# Patient Record
Sex: MALE | Race: WHITE | NOT HISPANIC OR LATINO | Employment: FULL TIME | URBAN - METROPOLITAN AREA
[De-identification: names, ages, dates, MRNs, and addresses within clinical notes are randomized per-mention and may not be internally consistent; named-entity substitution may affect disease eponyms.]

---

## 2018-05-25 LAB — HBA1C MFR BLD HPLC: 7 %

## 2018-07-06 ENCOUNTER — OFFICE VISIT (OUTPATIENT)
Dept: PULMONOLOGY | Facility: CLINIC | Age: 58
End: 2018-07-06
Payer: COMMERCIAL

## 2018-07-06 VITALS
OXYGEN SATURATION: 96 % | SYSTOLIC BLOOD PRESSURE: 124 MMHG | HEIGHT: 69 IN | HEART RATE: 77 BPM | DIASTOLIC BLOOD PRESSURE: 86 MMHG | BODY MASS INDEX: 33.18 KG/M2 | WEIGHT: 224 LBS

## 2018-07-06 DIAGNOSIS — E66.9 OBESITY (BMI 30-39.9): ICD-10-CM

## 2018-07-06 DIAGNOSIS — G47.33 OSA (OBSTRUCTIVE SLEEP APNEA): Primary | ICD-10-CM

## 2018-07-06 PROCEDURE — 99204 OFFICE O/P NEW MOD 45 MIN: CPT | Performed by: INTERNAL MEDICINE

## 2018-07-06 RX ORDER — VALSARTAN AND HYDROCHLOROTHIAZIDE 320; 12.5 MG/1; MG/1
320 TABLET, FILM COATED ORAL
COMMUNITY
Start: 2018-04-18

## 2018-07-06 RX ORDER — AMLODIPINE BESYLATE 10 MG/1
10 TABLET ORAL
COMMUNITY
Start: 2018-04-17

## 2018-07-06 RX ORDER — METFORMIN HYDROCHLORIDE 500 MG/1
500 TABLET, EXTENDED RELEASE ORAL
COMMUNITY
Start: 2018-05-26

## 2018-07-06 NOTE — PROGRESS NOTES
Assessment/Plan:     Diagnoses and all orders for this visit:    FESTUS (obstructive sleep apnea)  -     Diagnostic Sleep Study; Future    Obesity (BMI 30-39  9)    Other orders  -     amLODIPine (NORVASC) 10 mg tablet; 10 mg  -     metFORMIN (GLUCOPHAGE-XR) 500 mg 24 hr tablet; 500 mg  -     valsartan-hydrochlorothiazide (DIOVAN-HCT) 320-12 5 MG per tablet; 320 tablets      obstructive sleep apnea Etiology pathogenesis discussed with the patient in detail  Patient has signs and symptoms of obstructive sleep apnea  He will undergo an all night polysomnogram and if there is evidence of abnormal nocturnal breathing he will undergo a CPAP titration study for maximal benefit  Consequences of untreated sleep apnea including excessive daytime sleepiness and increased risk for myocardial infarction stroke atrial fibrillation discussed with the patient  Testing procedure was discussed in detail  Cautioned against driving when sleepy  Recommend weight loss  Follow-up after the above testing  Return in about 3 months (around 10/6/2018)  All questions are answered to the patient's satisfaction and understanding  He verbalizes understanding  He is encouraged to call with any further questions or concerns  Portions of the record may have been created with voice recognition software  Occasional wrong word or "sound a like" substitutions may have occurred due to the inherent limitations of voice recognition software  Read the chart carefully and recognize, using context, where substitutions have occurred  Electronically Signed by Kylee Crowell MD    ______________________________________________________________________    Chief Complaint:   Chief Complaint   Patient presents with    Sleep Apnea        Patient ID: Rene Mcgowan is a 62 y o  y o  male has a past medical history of Snores      7/6/2018  Patient is a very pleasant 51-year-old gentleman, who has never smoked, who works as a computer day, states in [place] has a 1-1/2 hour commute to work each way every day, works 5 days a week  He states he goes to bed at around 11:30, falls asleep right away and he is out of bed at 6:30 AM  He does not have any nocturnal awakenings  There is history of snoring and witnessed apneic spells as described by his wife  There is no history of any early morning headaches, no symptoms related to restless legs, no symptoms of lack of concentration or short-term memory loss or nocturnal heartburn  No symptoms related to anxiety or depression  He states that he was diagnosed with sleep apnea a few years ago, and was also placed on CPAP but he did not use it and just returned the CPAP right away  Given his snoring has worsened recently, and he states his wife has been complaining, for which his PCP had put in a referral for reevaluation for sleep apnea  Occupational/Exposure history:  Travel history:  Review of Systems   Constitutional: Positive for fatigue  Negative for appetite change, chills, diaphoresis, fever and unexpected weight change  HENT: Negative for congestion, ear discharge, ear pain, nosebleeds, postnasal drip, rhinorrhea, sinus pain, sore throat and voice change  Eyes: Negative for pain, discharge and visual disturbance  Respiratory: Negative for apnea, cough, choking, chest tightness, shortness of breath, wheezing and stridor  Cardiovascular: Negative for chest pain, palpitations and leg swelling  Gastrointestinal: Negative for abdominal pain, blood in stool, constipation, diarrhea and vomiting  Endocrine: Negative for cold intolerance, heat intolerance, polydipsia, polyphagia and polyuria  Genitourinary: Negative for difficulty urinating and dysuria  Musculoskeletal: Negative for arthralgias and neck pain  Skin: Negative for pallor and rash  Allergic/Immunologic: Negative for environmental allergies and food allergies     Neurological: Negative for dizziness, speech difficulty, weakness and light-headedness  Hematological: Negative for adenopathy  Does not bruise/bleed easily  Psychiatric/Behavioral: Negative for agitation, confusion and sleep disturbance  The patient is not nervous/anxious  Social history: He reports that he has never smoked  He has never used smokeless tobacco  He reports that he drinks about 1 2 oz of alcohol per week   Past surgical history: No past surgical history on file  Family history: No family history on file  There is no immunization history on file for this patient  Current Outpatient Prescriptions   Medication Sig Dispense Refill    amLODIPine (NORVASC) 10 mg tablet 10 mg      metFORMIN (GLUCOPHAGE-XR) 500 mg 24 hr tablet 500 mg      valsartan-hydrochlorothiazide (DIOVAN-HCT) 320-12 5 MG per tablet 320 tablets       No current facility-administered medications for this visit  Allergies: Patient has no known allergies  Objective:  Vitals:    07/06/18 1133   BP: 124/86   Pulse: 77   SpO2: 96%   Weight: 102 kg (224 lb)   Height: 5' 8 75" (1 746 m)   Oxygen Therapy  SpO2: 96 %    Wt Readings from Last 3 Encounters:   07/06/18 102 kg (224 lb)     Body mass index is 33 32 kg/m²  Physical Exam   Constitutional: He is oriented to person, place, and time  He appears well-developed and well-nourished  HENT:   Head: Normocephalic and atraumatic  Crowded oropharyngeal airways,bilateral tonsillar tissue enlargement, presence of redundant mucus   Mallampati score 4   Eyes: EOM are normal  Pupils are equal, round, and reactive to light  Neck: Normal range of motion  Neck supple  Short and wide neck   Cardiovascular: Normal rate, regular rhythm and normal heart sounds  Pulmonary/Chest: Effort normal and breath sounds normal    Abdominal: Soft  Bowel sounds are normal    Musculoskeletal: Normal range of motion  Neurological: He is alert and oriented to person, place, and time  Skin: Skin is warm and dry     Psychiatric: He has a normal mood and affect   His behavior is normal             ESS: Total score: 1

## 2018-07-10 ENCOUNTER — TELEPHONE (OUTPATIENT)
Dept: PULMONOLOGY | Facility: CLINIC | Age: 58
End: 2018-07-10

## 2018-07-13 ENCOUNTER — TELEPHONE (OUTPATIENT)
Dept: PULMONOLOGY | Facility: CLINIC | Age: 58
End: 2018-07-13

## 2018-07-19 ENCOUNTER — TELEPHONE (OUTPATIENT)
Dept: PULMONOLOGY | Facility: CLINIC | Age: 58
End: 2018-07-19

## 2018-07-19 DIAGNOSIS — G47.33 OSA (OBSTRUCTIVE SLEEP APNEA): Primary | ICD-10-CM

## 2018-08-09 LAB — MICROALBUMIN UR-MCNC: 91.3 MG/L (ref 0–20)

## 2018-08-30 ENCOUNTER — TELEPHONE (OUTPATIENT)
Dept: UROLOGY | Facility: CLINIC | Age: 58
End: 2018-08-30

## 2018-08-30 NOTE — TELEPHONE ENCOUNTER
Patient called and left message stating that he had received letter in the mail stating that he needs to call and reschedule appointment  Patient was scheduled for an appointment for elevated PSA on 8/31  Patient was canceled due to provider being out of the office  Patient should be reschedule within the next one month  I do not see any available appointments within the one month   Can you help me find a spot please

## 2018-08-30 NOTE — TELEPHONE ENCOUNTER
1st attempt, left message with appointment details asking for a call back to confirm and also mailed appointment card

## 2018-09-05 NOTE — PATIENT INSTRUCTIONS
PROSTATE CANCER SCREENING OVERVIEW    Prostate cancer screening involves testing for prostate cancer in men who have no symptoms of the disease  This testing can find cancer at an early stage  However, medical experts agree that prostate cancer screening should not be routinely ordered for all men and that screening can lead to both benefits and harms  This article is designed to review the advantages and disadvantages of prostate cancer screening  You should talk with your health care provider to decide what is best in your individual situation  WHAT IS PROSTATE CANCER? Prostate cancer is a cancer of the prostate, a small gland in men that is located below the bladder and in front of the rectum (figure 1)  The prostate produces fluid that helps carry sperm during ejaculation  Although many men are diagnosed with prostate cancer, most of them do not die from their cancer  Prostate cancer often grows so slowly that many men die of other causes before they even develop symptoms of prostate cancer  PROSTATE CANCER RISK FACTORS  Age -- All men are at risk for prostate cancer, but the risk greatly increases with older age  Prostate cancer is rarely found in men younger than 48years old  Ethnic background --  men develop prostate cancer more often than white and  men   men also are more likely to die of prostate cancer than white or  men  Family medical history -- Men who have a first-degree relative (a father or brother) with prostate cancer are more likely to develop the disease  Men with female relatives with breast cancer related to the breast cancer gene (BRCA) may also be more likely to develop prostate cancer  Diet -- A diet high in animal fat or low in vegetables may increase a man's risk of prostate cancer      PROSTATE CANCER SCREENING TESTS  Prostate cancer screening involves blood test that measures prostate-specific antigen (PSA)  Prostate-specific antigen (PSA) -- PSA is a protein produced by the prostate  The PSA test measures the amount of PSA in a sample of blood  Although many men with prostate cancer have an elevated PSA concentration, a high level does not necessarily mean there is a cancer  The most common cause for an elevated PSA is benign prostatic hyperplasia (BPH), a noncancerous enlargement of the prostate  Other causes include prostate infection (prostatitis), trauma (bicycle riding), and sexual activity  You should avoid ejaculating or riding a bike for at least 48 hours before having a PSA test  (See "Patient education: Benign prostatic hyperplasia (BPH) (Beyond the Basics)"  )    Rectal examination -- A rectal examination is sometimes recommended, along with measurement of the PSA, to screen for prostate cancer  However, studies have not shown that rectal examination is an effective screening test for prostate cancer when used alone  If the PSA test is positive -- A positive PSA test is not a reason to panic; noncancerous conditions are the most common causes for an abnormal test, particularly for PSA tests  On the other hand, a positive test should not be ignored  The first step in evaluating an elevated PSA is usually to repeat the test   You should avoid ejaculating and riding a bike for at least 48 hours before repeating the test  If the PSA remains elevated, a prostate biopsy or other testing is usually recommended  Prostate biopsy -- A prostate biopsy involves having a rectal ultrasound and use of a needle to obtain tissue samples from the prostate gland  The biopsy is usually performed in the office by a urologist (a doctor who specializes in treatment of urinary, bladder, and prostate issues)  After the procedure, most men feel sore and you may see some blood in the urine or semen, this can last for up to 4-6 weeks  Biopsies can rarely cause serious infections   Sometimes biopsies are guided by magnetic resonance imaging (MRI)  PROS AND CONS OF PROSTATE CANCER SCREENING    There are a number of arguments for and against prostate cancer screening  Arguments for screening -- Experts in favor of prostate cancer screening cite the following arguments:    ?Results from a large  study of prostate cancer screening found that men who had prostate-specific antigen (PSA) testing had a 20 percent lower chance of dying from prostate cancer after 13 years compared with men who did not have prostate cancer screening [1]  Men who had PSA testing also had a 30 percent lower chance of developing metastatic disease (cancer that has spread to other parts of the body) [2]  In another  study of prostate cancer screening, the mortality benefit was even larger to prostate cancer screening  (the [place] trial)    ? A substantial number of men die from prostate cancer every year and many more suffer from the complications of advanced disease  ? For men with an aggressive prostate cancer, the best chance for curing it is by finding it at an early stage and then treating it with surgery or radiation  Studies have shown that men who have prostate cancer detected by PSA screening tend to have earlier-stage cancer than men who have a cancer detected by other means  (See "Patient education: Treatment for advanced prostate cancer (Beyond the Basics)" and "Patient education: Prostate cancer treatment; stage I to III cancer (Beyond the Basics)" )    ? The five-year survival for men who have prostate cancer confined to the prostate gland (early stage) is nearly 100 percent; this drops to 27 percent for men whose cancer has spread to other areas of the body  However, many early-stage cancers are not aggressive, and the five-year survival for those will be nearly 100 percent even without any treatment [3]  ?The available screening tests are not perfect, but they are easy to perform and have fair accuracy    Arguments against screening -- Other arguments have also been made against screening:    ?Even though the  study found a benefit of prostate cancer screening, PSA testing prevented only about one prostate cancer death for every 1000 screened men after 13 years [1]  Furthermore, 76 percent of men with an abnormal PSA who had a prostate biopsy did not have prostate cancer  However, in the [place] study, the number needed to screen and treat was much lower indicating that prostate cancer screening is ineffective screening measure which decreases the morbidity and mortality of prostate cancer  ?Many prostate cancers detected with screening are unlikely to cause death or disability  Thus, a number of men will be diagnosed with cancer and potentially suffer the side effects of cancer treatment for cancers that never would have been found without prostate cancer screening  In other words, even if screening finds a cancer early, it is not clear in all cases that the cancer must be treated  IS PROSTATE CANCER SCREENING RIGHT FOR ME? The answer to this question is not the same for everyone  The table includes some questions you can ask yourself when weighing the potential risk and benefits of screening (table 1)  Professional organizations -- Major medical associations and societies, including the [employer] Task Force [4], American Cancer Society [5], American Urological Association [6], and many  cancer societies, agree that men should discuss screening with their health care providers  Men should be informed about the benefits and risks of prostate cancer screening and treatment and make decisions that best reflect their personal values and preferences  Age to first consider screening -- Screening discussions should begin at age 48 years for men at average risk for developing prostate cancer   Men with risk factors for prostate cancer (such as black men or men with a father or brother who had prostate cancer) may want to begin screening discussions at age 36 to 39 years  How often to perform screening -- Once screening begins, it should occur every two to four years (if continued testing is desired) and should include a PSA blood test   Age to stop screening -- Guidelines suggest stopping screening after age 71, though some experts would continue offering screening to very healthy men beyond that age  Screening not recommended -- Screening is generally not recommended for men whose life expectancy, or ability to undergo curative treatment, is limited by serious health problems  In these situations, the potential benefits of screening are outweighed by the likely harms  WHERE TO GET MORE INFORMATION  Your healthcare provider is the best source of information for questions and concerns related to your medical problem  This article will be updated as needed on our web site (www [employer]/patients)  Related topics for patients, as well as selected articles written for healthcare professionals, are also available  Some of the most relevant are listed below  Patient level information -- [employer] offers two types of patient education materials  The Basics -- The Basics patient education pieces answer the four or five key questions a patient might have about a given condition  These articles are best for patients who want a general overview and who prefer short, easy-to-read materials  Patient education: Prostate cancer screening (PSA tests) (The Basics)  Patient education: Prostate cancer (The Basics)  Patient education: Cancer screening (The Basics)  Patient education: Choosing treatment for low-risk localized prostate cancer (The Basics)  Beyond the Basics -- Beyond the Basics patient education pieces are longer, more sophisticated, and more detailed  These articles are best for patients who want in-depth information and are comfortable with some medical jargon    Patient education: Treatment for advanced prostate cancer (Beyond the Basics)  Patient education: Prostate cancer treatment; stage I to III cancer (Beyond the Basics)  Patient education: Benign prostatic hyperplasia (BPH) (Beyond the Basics)  Professional level information -- Professional level articles are designed to keep doctors and other health professionals up-to-date on the latest medical findings  These articles are thorough, long, and complex, and they contain multiple references to the research on which they are based  Professional level articles are best for people who are comfortable with a lot of medical terminology and who want to read the same materials their doctors are reading  Measurement of prostate-specific antigen  Screening for prostate cancer  The following organizations also provide reliable health information  ? [employer]  1-726-0-CANCER  (www cancer gov/types/prostate)  ? American Society for Clinical Oncology (patient information website)  (www cancer  net)  ? [address] [place] [address] (patient and caregiver information website)  (www nccn org/patients)  ? [address]  0-191-SZS-2345  ([employer])  ? [employer]  (www [employer] gov/healthtopics  html)  ? [employer] Prostate Cancer Education and Support  (www ustoo  org/Detection-PSA-And-TITO)        Prostate Specific Antigen   WHAT YOU NEED TO KNOW:   What is a prostate specific antigen (PSA) test?  A PSA test is a blood test used to screen men for prostate cancer  A PSA test is also used to monitor how well prostate cancer treatment is working  What other test may be done with a PSA test?  A digital rectal exam is usually performed with a PSA test  Your healthcare provider will insert a gloved finger into your rectum to feel if your prostate is large, firm, or has lumps  Who needs a PSA test?  Some experts recommend a PSA test for men ages 48 to 79   They also recommend testing men with a high risk for prostate cancer at age 36 or 39  Risk factors may include being  or having a brother or father with prostate cancer  Other experts may not recommend PSA testing  Your healthcare provider can help you decide if you need a PSA test    What do the results of a PSA test mean? Most healthy men have a PSA level less than 4 ng/mL  If your PSA level is higher than 4 ng/mL you may need more tests  Examples include blood or urine tests, an ultrasound, MRI, CT scan, or a prostate biopsy  Ask your healthcare provider for more information on these tests  What else can cause a high PSA level? A high PSA level does not always mean you have prostate cancer  Certain conditions or procedures can increase PSA levels  Examples include:  · Older age    · Procedures such as a prostate biopsy or a cystoscopy    · An enlarged prostate    · Recent sexual activity    · A prostate infection    · Certain exercises that put pressure on the prostate such as bicycling    · Medicine such as testosterone  CARE AGREEMENT:   You have the right to help plan your care  Learn about your health condition and how it may be treated  Discuss treatment options with your caregivers to decide what care you want to receive  You always have the right to refuse treatment  The above information is an  only  It is not intended as medical advice for individual conditions or treatments  Talk to your doctor, nurse or pharmacist before following any medical regimen to see if it is safe and effective for you  © 2017 [address] Information is for End User's use only and may not be sold, redistributed or otherwise used for commercial purposes  All illustrations and images included in CareNotes® are the copyrighted property of [employer]  or [employer] Prado

## 2018-09-05 NOTE — PROGRESS NOTES
Problem List Items Addressed This Visit     Elevated PSA, less than 10 ng/ml - Primary     I discussed with the patient the discovery of the PSA molecule and its original use in determining the return of prostate cancer after definitive therapy  I described the normal function of the PSA molecule in the reproductive process and also discussed the detection of PSA in the blood  We discussed the controversial history of PSA screening for prostate cancer in the [facility] as well as the risk of over detection and over treatment of prostate cancer by way of PSA screening  The patient understands that PSA blood testing is an imperfect way to screen for prostate cancer and that elevated PSA levels in the blood may also be caused by infection, inflammation, prostatic trauma or manipulation, urological procedures, or by benign prostatic enlargement  The role of the digital rectal examination in prostate cancer screening was also discussed and I discussed with him that there is large interobserver variability in the findings of digital rectal examination  We discussed the continued workup of elevated PSA or abnormal digital rectal examination in the form of the performance of a prostate biopsy  The preparation for, and steps of, an office-based transrectal ultrasound of prostate biopsy were described to the patient  Benefits of obtaining tissue for pathologic analysis were discussed with him and the risks of prostate biopsy were also discussed at length  These risks include but are not limited to infection, bleeding, pain, sepsis with need for admission to hospital, risk of change in sexual function, and risk of diagnosis with prostate cancer  Alternatives to prostate biopsy in the form of continued PSA and TITO surveillance were also offered to him  All of his questions and concerns were answered and addressed with regard to that detailed above      Going forward we will plan to see him back in a number of weeks to review his PSA free and total, if this is elevated I am sent this electronically we will set him up for prostate biopsy at our next visit           Relevant Orders    Urinalysis with microscopic    PSA, total and free              Assessment and plan:       Please see problem oriented charting for the assessment plan of today's urological complaints    Lana Ibarra MD      Chief Complaint     Chief Complaint   Patient presents with    Elevated PSA         History of Present Illness     Randy Alamo is a 62 y o  gentleman referred in consultation by Dr Petros Mena for elevated PSA  A report of today's consultation report will be sent to the referring physician in the name of continuity of care  The patient is never had elevated PSA previously, he has no family history of prostate cancer or of lower urinary tract symptoms, and his PSA was seen to be 5 0 nanograms/milliliter as of May 2018  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  He does not have any dysuria, incontinence, hesitancy of urination, gross hematuria, urgency, he feels empty after voiding and stream quality is good and he awakens once at night to void  His AUA symptom score is 1 with a bother score of 0  He was unable to give us a urinalysis in the office today  His review of systems is otherwise negative today  The following portions of the patient's history were reviewed and updated as appropriate: allergies, current medications, past family history, past medical history, past social history, past surgical history and problem list     Detailed Urologic History     - please refer to HPI    Review of Systems     Review of Systems   Constitutional: Negative  HENT: Negative  Eyes: Negative  Respiratory: Negative  Cardiovascular: Negative  Gastrointestinal: Negative  Endocrine: Negative  Genitourinary:        As per HPI   Musculoskeletal: Negative  Skin: Negative  Allergic/Immunologic: Negative  Neurological: Negative  Hematological: Negative  Psychiatric/Behavioral: Negative  Allergies     No Known Allergies    Physical Exam     Physical Exam   Constitutional: He is oriented to person, place, and time  He appears well-developed and well-nourished  No distress  Appears nontoxic, BMI 33 9, dark skinned Hong Ebenezer man   HENT:   Head: Normocephalic and atraumatic  Right Ear: External ear normal    Left Ear: External ear normal    Mouth/Throat: No oropharyngeal exudate  Eyes: Conjunctivae and EOM are normal  Pupils are equal, round, and reactive to light  Right eye exhibits no discharge  Left eye exhibits no discharge  No scleral icterus  Neck: Normal range of motion  Neck supple  No tracheal deviation present  Cardiovascular: Regular rhythm and intact distal pulses  Pulmonary/Chest: Effort normal  No stridor  No respiratory distress  He has no wheezes  Abdominal: Soft  He exhibits no distension and no mass  There is no tenderness  There is no rebound and no guarding  No hernia  Hernia confirmed negative in the right inguinal area and confirmed negative in the left inguinal area  Genitourinary: Rectal exam shows no external hemorrhoid, no internal hemorrhoid, no fissure, no mass, no tenderness and anal tone normal  Prostate is enlarged (35 grams, smooth, no nodules)  Prostate is not tender  Right testis shows no mass, no swelling and no tenderness  Right testis is descended  Cremasteric reflex is not absent on the right side  Left testis shows no mass, no swelling and no tenderness  Left testis is descended  Cremasteric reflex is not absent on the left side  Uncircumcised  No phimosis, paraphimosis, hypospadias, penile erythema or penile tenderness  No discharge found  Musculoskeletal: Normal range of motion  He exhibits no edema, tenderness or deformity  Lymphadenopathy: No inguinal adenopathy noted on the right or left side  Neurological: He is alert and oriented to person, place, and time  No cranial nerve deficit or sensory deficit  He exhibits normal muscle tone  Coordination normal    Skin: Skin is warm and dry  No rash noted  He is not diaphoretic  No erythema  No pallor  Psychiatric: He has a normal mood and affect  His behavior is normal  Judgment and thought content normal    Nursing note and vitals reviewed  Vital Signs  Vitals:    09/06/18 0927   BP: 132/90   BP Location: Left arm   Patient Position: Sitting   Cuff Size: Adult   Pulse: 74   Weight: 103 kg (228 lb)   Height: 5' 8 75" (1 746 m)         Current Medications       Current Outpatient Prescriptions:     amLODIPine (NORVASC) 10 mg tablet, 10 mg, Disp: , Rfl:     metFORMIN (GLUCOPHAGE-XR) 500 mg 24 hr tablet, 500 mg, Disp: , Rfl:     valsartan-hydrochlorothiazide (DIOVAN-HCT) 320-12 5 MG per tablet, 320 tablets, Disp: , Rfl:     losartan-hydrochlorothiazide (HYZAAR) 100-25 MG per tablet, , Disp: , Rfl:       Active Problems     Patient Active Problem List   Diagnosis    Elevated PSA, less than 10 ng/ml         Past Medical History     Past Medical History:   Diagnosis Date    Snores          Surgical History     History reviewed  No pertinent surgical history  Family History     History reviewed  No pertinent family history        Social History     Social History     History   Smoking Status    Never Smoker   Smokeless Tobacco    Never Used         Pertinent Lab Values     No results found for: CREATININE     Testosterone seen to be 411, normal  PSA 5 0 as of May 2018        Pertinent Imaging      There is no pertinent urological imaging for my review

## 2018-09-05 NOTE — TELEPHONE ENCOUNTER
PT confirmed  Notes: elevated psa   Made On:  Changed:  Confirmed: 8/30/2018 1:53 PM  8/31/2018 3:03 PM  9/5/2018 9:40 AM By:  By:  By: Silverio Dalton (DENICE)  Silverio Dalton (DENICE)  Jerica Krishnan (DENICE)

## 2018-09-06 ENCOUNTER — OFFICE VISIT (OUTPATIENT)
Dept: UROLOGY | Facility: CLINIC | Age: 58
End: 2018-09-06
Payer: COMMERCIAL

## 2018-09-06 ENCOUNTER — TELEPHONE (OUTPATIENT)
Dept: UROLOGY | Facility: CLINIC | Age: 58
End: 2018-09-06

## 2018-09-06 VITALS
WEIGHT: 228 LBS | BODY MASS INDEX: 33.77 KG/M2 | SYSTOLIC BLOOD PRESSURE: 132 MMHG | HEART RATE: 74 BPM | DIASTOLIC BLOOD PRESSURE: 90 MMHG | HEIGHT: 69 IN

## 2018-09-06 DIAGNOSIS — R97.20 ELEVATED PSA, LESS THAN 10 NG/ML: Primary | ICD-10-CM

## 2018-09-06 PROCEDURE — 99244 OFF/OP CNSLTJ NEW/EST MOD 40: CPT | Performed by: UROLOGY

## 2018-09-06 RX ORDER — LOSARTAN POTASSIUM AND HYDROCHLOROTHIAZIDE 25; 100 MG/1; MG/1
TABLET ORAL
COMMUNITY
Start: 2018-07-20

## 2018-09-06 NOTE — TELEPHONE ENCOUNTER
Pt needs a two week followup in [place] with Dr Cathie Fonseca to go over the labs he will be having done

## 2018-09-06 NOTE — ASSESSMENT & PLAN NOTE
I discussed with the patient the discovery of the PSA molecule and its original use in determining the return of prostate cancer after definitive therapy  I described the normal function of the PSA molecule in the reproductive process and also discussed the detection of PSA in the blood  We discussed the controversial history of PSA screening for prostate cancer in the [place] as well as the risk of over detection and over treatment of prostate cancer by way of PSA screening  The patient understands that PSA blood testing is an imperfect way to screen for prostate cancer and that elevated PSA levels in the blood may also be caused by infection, inflammation, prostatic trauma or manipulation, urological procedures, or by benign prostatic enlargement  The role of the digital rectal examination in prostate cancer screening was also discussed and I discussed with him that there is large interobserver variability in the findings of digital rectal examination  We discussed the continued workup of elevated PSA or abnormal digital rectal examination in the form of the performance of a prostate biopsy  The preparation for, and steps of, an office-based transrectal ultrasound of prostate biopsy were described to the patient  Benefits of obtaining tissue for pathologic analysis were discussed with him and the risks of prostate biopsy were also discussed at length  These risks include but are not limited to infection, bleeding, pain, sepsis with need for admission to hospital, risk of change in sexual function, and risk of diagnosis with prostate cancer  Alternatives to prostate biopsy in the form of continued PSA and TITO surveillance were also offered to him  All of his questions and concerns were answered and addressed with regard to that detailed above      Going forward we will plan to see him back in a number of weeks to review his PSA free and total, if this is elevated I am sent this electronically we will set him up for prostate biopsy at our next visit

## 2018-09-06 NOTE — LETTER
September 6, 2018     [address] [facility] [address]    Patient: Avinash Steele   YOB: 1960   Date of Visit: 9/6/2018       Dear Dr Shanda Malone: Thank you for referring Avinash Steele to me for evaluation  Below are my notes for this consultation  If you have questions, please do not hesitate to call me  I look forward to following your patient along with you  Sincerely,        Turner Vergara MD        CC: No Recipients  Turner Vergara MD  9/6/2018  9:56 AM  Sign at close encounter       Problem List Items Addressed This Visit     Elevated PSA, less than 10 ng/ml - Primary     I discussed with the patient the discovery of the PSA molecule and its original use in determining the return of prostate cancer after definitive therapy  I described the normal function of the PSA molecule in the reproductive process and also discussed the detection of PSA in the blood  We discussed the controversial history of PSA screening for prostate cancer in the [place] as well as the risk of over detection and over treatment of prostate cancer by way of PSA screening  The patient understands that PSA blood testing is an imperfect way to screen for prostate cancer and that elevated PSA levels in the blood may also be caused by infection, inflammation, prostatic trauma or manipulation, urological procedures, or by benign prostatic enlargement  The role of the digital rectal examination in prostate cancer screening was also discussed and I discussed with him that there is large interobserver variability in the findings of digital rectal examination  We discussed the continued workup of elevated PSA or abnormal digital rectal examination in the form of the performance of a prostate biopsy  The preparation for, and steps of, an office-based transrectal ultrasound of prostate biopsy were described to the patient   Benefits of obtaining tissue for pathologic analysis were discussed with him and the risks of prostate biopsy were also discussed at length  These risks include but are not limited to infection, bleeding, pain, sepsis with need for admission to hospital, risk of change in sexual function, and risk of diagnosis with prostate cancer  Alternatives to prostate biopsy in the form of continued PSA and TITO surveillance were also offered to him  All of his questions and concerns were answered and addressed with regard to that detailed above  Going forward we will plan to see him back in a number of weeks to review his PSA free and total, if this is elevated I am sent this electronically we will set him up for prostate biopsy at our next visit           Relevant Orders    Urinalysis with microscopic    PSA, total and free              Assessment and plan:       Please see problem oriented charting for the assessment plan of today's urological complaints    Tushar Mcgee MD      Chief Complaint     Chief Complaint   Patient presents with    Elevated PSA         History of Present Illness     Brennan Azevedo is a 62 y o  gentleman referred in consultation by Dr Az peter for elevated PSA  A report of today's consultation report will be sent to the referring physician in the name of continuity of care  The patient is never had elevated PSA previously, he has no family history of prostate cancer or of lower urinary tract symptoms, and his PSA was seen to be 5 0 nanograms/milliliter as of May 2018  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  He does not have any dysuria, incontinence, hesitancy of urination, gross hematuria, urgency, he feels empty after voiding and stream quality is good and he awakens once at night to void  His AUA symptom score is 1 with a bother score of 0  He was unable to give us a urinalysis in the office today  His review of systems is otherwise negative today      The following portions of the patient's history were reviewed and updated as appropriate: allergies, current medications, past family history, past medical history, past social history, past surgical history and problem list     Detailed Urologic History     - please refer to HPI    Review of Systems     Review of Systems   Constitutional: Negative  HENT: Negative  Eyes: Negative  Respiratory: Negative  Cardiovascular: Negative  Gastrointestinal: Negative  Endocrine: Negative  Genitourinary:        As per HPI   Musculoskeletal: Negative  Skin: Negative  Allergic/Immunologic: Negative  Neurological: Negative  Hematological: Negative  Psychiatric/Behavioral: Negative  Allergies     No Known Allergies    Physical Exam     Physical Exam   Constitutional: He is oriented to person, place, and time  He appears well-developed and well-nourished  No distress  Appears nontoxic, BMI 33 9, dark skinned [place] Eb[place]zer man   HENT:   Head: Normocephalic and atraumatic  Right Ear: External ear normal    Left Ear: External ear normal    Mouth/Throat: No oropharyngeal exudate  Eyes: Conjunctivae and EOM are normal  Pupils are equal, round, and reactive to light  Right eye exhibits no discharge  Left eye exhibits no discharge  No scleral icterus  Neck: Normal range of motion  Neck supple  No tracheal deviation present  Cardiovascular: Regular rhythm and intact distal pulses  Pulmonary/Chest: Effort normal  No stridor  No respiratory distress  He has no wheezes  Abdominal: Soft  He exhibits no distension and no mass  There is no tenderness  There is no rebound and no guarding  No hernia  Hernia confirmed negative in the right inguinal area and confirmed negative in the left inguinal area  Genitourinary: Rectal exam shows no external hemorrhoid, no internal hemorrhoid, no fissure, no mass, no tenderness and anal tone normal  Prostate is enlarged (35 grams, smooth, no nodules)  Prostate is not tender   Right testis shows no mass, no swelling and no tenderness  Right testis is descended  Cremasteric reflex is not absent on the right side  Left testis shows no mass, no swelling and no tenderness  Left testis is descended  Cremasteric reflex is not absent on the left side  Uncircumcised  No phimosis, paraphimosis, hypospadias, penile erythema or penile tenderness  No discharge found  Musculoskeletal: Normal range of motion  He exhibits no edema, tenderness or deformity  Lymphadenopathy: No inguinal adenopathy noted on the right or left side  Neurological: He is alert and oriented to person, place, and time  No cranial nerve deficit or sensory deficit  He exhibits normal muscle tone  Coordination normal    Skin: Skin is warm and dry  No rash noted  He is not diaphoretic  No erythema  No pallor  Psychiatric: He has a normal mood and affect  His behavior is normal  Judgment and thought content normal    Nursing note and vitals reviewed  Vital Signs  Vitals:    09/06/18 0927   BP: 132/90   BP Location: Left arm   Patient Position: Sitting   Cuff Size: Adult   Pulse: 74   Weight: 103 kg (228 lb)   Height: 5' 8 75" (1 746 m)         Current Medications       Current Outpatient Prescriptions:     amLODIPine (NORVASC) 10 mg tablet, 10 mg, Disp: , Rfl:     metFORMIN (GLUCOPHAGE-XR) 500 mg 24 hr tablet, 500 mg, Disp: , Rfl:     valsartan-hydrochlorothiazide (DIOVAN-HCT) 320-12 5 MG per tablet, 320 tablets, Disp: , Rfl:     losartan-hydrochlorothiazide (HYZAAR) 100-25 MG per tablet, , Disp: , Rfl:       Active Problems     Patient Active Problem List   Diagnosis    Elevated PSA, less than 10 ng/ml         Past Medical History     Past Medical History:   Diagnosis Date    Snores          Surgical History     History reviewed  No pertinent surgical history  Family History     History reviewed  No pertinent family history        Social History     Social History     History   Smoking Status    Never Smoker   Smokeless Tobacco    Never Used Pertinent Lab Values     No results found for: CREATININE     Testosterone seen to be 411, normal  PSA 5 0 as of May 2018        Pertinent Imaging      There is no pertinent urological imaging for my review

## 2018-09-10 ENCOUNTER — APPOINTMENT (OUTPATIENT)
Dept: LAB | Facility: CLINIC | Age: 58
End: 2018-09-10
Payer: COMMERCIAL

## 2018-09-10 DIAGNOSIS — R97.20 ELEVATED PSA, LESS THAN 10 NG/ML: ICD-10-CM

## 2018-09-10 LAB
BACTERIA UR QL AUTO: ABNORMAL /HPF
BILIRUB UR QL STRIP: NEGATIVE
CLARITY UR: CLEAR
COLOR UR: YELLOW
GLUCOSE UR STRIP-MCNC: ABNORMAL MG/DL
HGB UR QL STRIP.AUTO: NEGATIVE
HYALINE CASTS #/AREA URNS LPF: ABNORMAL /LPF
KETONES UR STRIP-MCNC: NEGATIVE MG/DL
LEUKOCYTE ESTERASE UR QL STRIP: ABNORMAL
NITRITE UR QL STRIP: NEGATIVE
NON-SQ EPI CELLS URNS QL MICRO: ABNORMAL /HPF
PH UR STRIP.AUTO: 5.5 [PH] (ref 4.5–8)
PROT UR STRIP-MCNC: NEGATIVE MG/DL
RBC #/AREA URNS AUTO: ABNORMAL /HPF
SP GR UR STRIP.AUTO: 1.02 (ref 1–1.03)
UROBILINOGEN UR QL STRIP.AUTO: 0.2 E.U./DL
WBC #/AREA URNS AUTO: ABNORMAL /HPF

## 2018-09-10 PROCEDURE — 36415 COLL VENOUS BLD VENIPUNCTURE: CPT

## 2018-09-10 PROCEDURE — 84153 ASSAY OF PSA TOTAL: CPT

## 2018-09-10 PROCEDURE — 84154 ASSAY OF PSA FREE: CPT

## 2018-09-10 PROCEDURE — 81001 URINALYSIS AUTO W/SCOPE: CPT | Performed by: UROLOGY

## 2018-09-11 LAB
PSA FREE MFR SERPL: 6.1 %
PSA FREE SERPL-MCNC: 0.36 NG/ML
PSA SERPL-MCNC: 5.9 NG/ML (ref 0–4)

## 2018-09-27 NOTE — PATIENT INSTRUCTIONS
Prostate Gland Needle Biopsy   WHAT YOU NEED TO KNOW:   A prostate gland needle biopsy is a procedure to remove samples of tissue from your prostate gland  The prostate is a gland in men located just below the bladder and surrounds the urethra (tube that carries urine out of the body)  After the samples are removed, they are sent to a lab and tested for cancer  HOW TO PREPARE:   Before your procedure:   · Ask your caregiver if you need to stop using aspirin or any other prescribed or over-the-counter medicine before your procedure or surgery  · Bring your medicine bottles or a list of your medicines when you see your caregiver  Tell your caregiver if you are allergic to any medicine  Tell your caregiver if you use any herbs, food supplements, or over-the-counter medicine  · You may need to start taking antibiotic medicine 1 day before your procedure  This medicine can help prevent an infection caused by bacteria  You may also need to take antibiotic medicine after your procedure  · Write down the correct date, time, and location of your procedure  The night before your procedure:  Ask caregivers about directions for eating and drinking  The day of your procedure:   · You or a close family member will be asked to sign a legal document called a consent form  It gives caregivers permission to do the procedure or surgery  It also explains the problems that may happen, and your choices  Make sure all your questions are answered before you sign this form  · Caregivers may insert an intravenous tube (IV) into your vein  A vein in the arm is usually chosen  Through the IV tube, you may be given liquids and medicine  · You may be given an enema (liquid medicine put in your rectum) to help empty your bowel  · An anesthesiologist will talk to you before your surgery  You may need medicine to keep you asleep or numb an area of your body during surgery   Tell caregivers if you or anyone in your family has had a problem with anesthesia in the past   WHAT WILL HAPPEN:   What will happen:   · You may be given anesthesia to help keep you comfortable during the procedure  Anesthesia medicines may include numbing gel put into your rectum or shots of numbing medicine given near your prostate  You may have spinal anesthesia to numb the area below your waist  You may also get general anesthesia to keep you asleep and free from pain during the procedure  · A transrectal ultrasound may be used to guide the procedure  A small tube will be put into your rectum to show pictures of your prostate on a monitor  A biopsy needle will be put in through your rectum into your prostate gland  A small sample of tissue will be removed with the needle  Your healthcare provider may take between 6 to 12 samples of tissue from different areas of your prostate gland  A new needle will be used to take each tissue sample  Each sample will be sent to a lab and tested for cancer  After your procedure: You will be able to rest until you are fully awake  Do not  get out of bed until your healthcare provider says it is okay  Once healthcare providers see that you are not having any problems, you may be able to go home  CONTACT YOUR HEALTHCARE PROVIDER IF:   · You are late or cannot make it to your procedure  · You have a fever  SEEK CARE IMMEDIATELY IF:   · You urinate very little or not at all  · You have new or increased pain in your lower abdomen or rectum  RISKS:   · During your procedure, your blood pressure may drop and make you feel dizzy  You may feel pain during or after your procedure  Your bladder, prostate, urethra, and nearby tissues or organs may be damaged during the procedure  After your procedure, you may have bruises and bleeding from your rectum  You may have blood in your urine, bowel movements, or semen  You may get an infection in your urinary tract or prostate gland   The infection may spread to your blood and the rest of your body  · If you have prostate cancer, the biopsy may not show the cancer  The biopsy may show cancer when there is no cancer in your prostate gland  You may need another prostate biopsy  · If you do not have a prostate gland biopsy, you may not learn the cause of your prostate problem  You may not get proper treatment  A prostate gland infection may cause pain and problems when you urinate  An enlarged prostate gland may block your urine flow  If you have prostate cancer, it may spread to other areas of your body and become life-threatening  CARE AGREEMENT:   You have the right to help plan your care  Learn about your health condition and how it may be treated  Discuss treatment options with your caregivers to decide what care you want to receive  You always have the right to refuse treatment  © 2017 [employer] Information is for End User's use only and may not be sold, redistributed or otherwise used for commercial purposes  All illustrations and images included in CareNotes® are the copyrighted property of [employer]  or [employer]  The above information is an  only  It is not intended as medical advice for individual conditions or treatments  Talk to your doctor, nurse or pharmacist before following any medical regimen to see if it is safe and effective for you

## 2018-09-27 NOTE — PROGRESS NOTES
Problem List Items Addressed This Visit     Elevated PSA, less than 10 ng/ml - Primary     I shared with the patient that his PSA has gone from 5-5 9 with a percent free PSA of around 6  As such there is a higher chance that he has clinically significant prostate cancer present upon prostate biopsy  We reviewed the pre, liliam, and postoperative care for prostate biopsy as well as the preparation for this procedure in the office and avoidance of blood thinners and anti-platelet agents  Plan:  He wishes to proceed to biopsy and this will be scheduled in the coming weeks         Relevant Medications    bisacodyl (FLEET) 10 MG/30ML ENEM    ciprofloxacin (CIPRO) 500 mg tablet    magnesium citrate (CITROMA) 1 745 g/30 mL oral solution                Assessment and plan:       Please see problem oriented charting for the assessment plan of today's urological complaints     Malia Fernández MD      Chief Complaint     Chief Complaint   Patient presents with    Elevated PSA         History of Present Illness     Pamela Ng is a 62 y o  gentleman that I have seen previously for elevated PSA  His PSA was 5 in May, we recheck this to make sure that this was not an outlier value in his PSA has increased again to 5 9 with a low free percentage of free PSA indicating a higher risk for clinically significant prostate cancer  AUA symptom score is 1 with a bother score of 0  On review of systems today he denies dysuria, incontinence, hesitancy of urination, gross hematuria, urgency, nocturia once per night, he feels empty after voiding and stream quality is good  The following portions of the patient's history were reviewed and updated as appropriate: allergies, current medications, past family history, past medical history, past social history, past surgical history and problem list     Detailed Urologic History     - please refer to HPI    Review of Systems     Review of Systems   Constitutional: Negative  HENT: Negative  Eyes: Negative  Respiratory: Negative  Cardiovascular: Negative  Gastrointestinal: Negative  Endocrine: Negative  Genitourinary:        As per HPI   Musculoskeletal: Negative  Skin: Negative  Allergic/Immunologic: Negative  Neurological: Negative  Hematological: Negative  Psychiatric/Behavioral: Negative  Allergies     No Known Allergies    Physical Exam     Physical Exam   Constitutional: He is oriented to person, place, and time  He appears well-developed and well-nourished  No distress  HENT:   Head: Normocephalic and atraumatic  Right Ear: External ear normal    Left Ear: External ear normal    Nose: Nose normal    Eyes: Pupils are equal, round, and reactive to light  Conjunctivae and EOM are normal  Right eye exhibits no discharge  Left eye exhibits no discharge  No scleral icterus  Neck: Neck supple  Cardiovascular: Intact distal pulses  Pulmonary/Chest: Effort normal  No stridor  No respiratory distress  He has no wheezes  Abdominal: He exhibits no distension  Musculoskeletal: Normal range of motion  He exhibits no edema or deformity  Neurological: He is alert and oriented to person, place, and time  No cranial nerve deficit  Coordination normal    Skin: Skin is warm and dry  No rash noted  He is not diaphoretic  No erythema  No pallor  Psychiatric: He has a normal mood and affect  His behavior is normal  Judgment and thought content normal    Nursing note and vitals reviewed            Vital Signs  Vitals:    09/28/18 1117   BP: 132/80   BP Location: Left arm   Patient Position: Sitting   Cuff Size: Adult   Pulse: 74   Weight: 103 kg (226 lb)   Height: 5' 8 75" (1 746 m)         Current Medications       Current Outpatient Prescriptions:     amLODIPine (NORVASC) 10 mg tablet, 10 mg, Disp: , Rfl:     losartan-hydrochlorothiazide (HYZAAR) 100-25 MG per tablet, , Disp: , Rfl:     metFORMIN (GLUCOPHAGE-XR) 500 mg 24 hr tablet, 500 mg, Disp: , Rfl:     valsartan-hydrochlorothiazide (DIOVAN-HCT) 320-12 5 MG per tablet, 320 tablets, Disp: , Rfl:     bisacodyl (FLEET) 10 MG/30ML ENEM, Insert 30 mL (10 mg total) into the rectum once for 1 dose Use this enema the morning of your prostate biopsy, Disp: 1 enema, Rfl: 0    ciprofloxacin (CIPRO) 500 mg tablet, Take 1 tablet (500 mg total) by mouth every 12 (twelve) hours for 3 days Start this medication the day prior to your prostate biopsy, Disp: 6 tablet, Rfl: 0    magnesium citrate (CITROMA) 1 745 g/30 mL oral solution, Take 296 mL by mouth once for 1 dose Take this medication the night prior to your prostate biopsy, Disp: 1 Bottle, Rfl: 0      Active Problems     Patient Active Problem List   Diagnosis    Elevated PSA, less than 10 ng/ml         Past Medical History     Past Medical History:   Diagnosis Date    Snores          Surgical History     History reviewed  No pertinent surgical history  Family History     History reviewed  No pertinent family history        Social History     Social History     History   Smoking Status    Never Smoker   Smokeless Tobacco    Never Used         Pertinent Lab Values     No results found for: CREATININE    Lab Results   Component Value Date    PSA 5 9 (H) 09/10/2018             Pertinent Imaging      There is no pertinent urological imaging for my review

## 2018-09-28 ENCOUNTER — OFFICE VISIT (OUTPATIENT)
Dept: UROLOGY | Facility: CLINIC | Age: 58
End: 2018-09-28
Payer: COMMERCIAL

## 2018-09-28 ENCOUNTER — TELEPHONE (OUTPATIENT)
Dept: UROLOGY | Facility: CLINIC | Age: 58
End: 2018-09-28

## 2018-09-28 VITALS
HEIGHT: 69 IN | SYSTOLIC BLOOD PRESSURE: 132 MMHG | DIASTOLIC BLOOD PRESSURE: 80 MMHG | WEIGHT: 226 LBS | HEART RATE: 74 BPM | BODY MASS INDEX: 33.47 KG/M2

## 2018-09-28 DIAGNOSIS — R97.20 ELEVATED PSA, LESS THAN 10 NG/ML: Primary | ICD-10-CM

## 2018-09-28 PROCEDURE — 99214 OFFICE O/P EST MOD 30 MIN: CPT | Performed by: UROLOGY

## 2018-09-28 RX ORDER — CIPROFLOXACIN 500 MG/1
500 TABLET, FILM COATED ORAL EVERY 12 HOURS SCHEDULED
Qty: 6 TABLET | Refills: 0 | Status: SHIPPED | OUTPATIENT
Start: 2018-09-28 | End: 2018-10-01

## 2018-09-28 RX ORDER — MAGNESIUM CARB/ALUMINUM HYDROX 105-160MG
296 TABLET,CHEWABLE ORAL ONCE
Qty: 1 BOTTLE | Refills: 0 | Status: SHIPPED | OUTPATIENT
Start: 2018-09-28 | End: 2019-03-06

## 2018-09-28 NOTE — ASSESSMENT & PLAN NOTE
I shared with the patient that his PSA has gone from 5-5 9 with a percent free PSA of around 6  As such there is a higher chance that he has clinically significant prostate cancer present upon prostate biopsy  We reviewed the pre, liliam, and postoperative care for prostate biopsy as well as the preparation for this procedure in the office and avoidance of blood thinners and anti-platelet agents      Plan:  He wishes to proceed to biopsy and this will be scheduled in the coming weeks

## 2018-09-28 NOTE — TELEPHONE ENCOUNTER
Patient managed by Arturo Perez at the [facility] office  Patient had a question about laxative he needs to do for biopsy  Called and spoke to patient  Made patient aware that antibiotic, enema, and magnesium citrate were all sent to the pharmacy for him and that he does the magnesium citrate the night before the biopsy and the enema the day of the biopsy  Patient verbalized understanding and denies any other questions

## 2018-09-28 NOTE — LETTER
September 28, 2018     Krishna Matthews MD  [address] [id number]    Patient: Evan Luna   YOB: 1960   Date of Visit: 9/28/2018       Dear Dr Jim Nogueira: Thank you for referring Evan Luna to me for evaluation  Below are my notes for this consultation  If you have questions, please do not hesitate to call me  I look forward to following your patient along with you  Sincerely,        Flako Starr MD        CC: No Recipients  Flako Starr MD  9/28/2018 11:34 AM  Sign at close encounter       Problem List Items Addressed This Visit     Elevated PSA, less than 10 ng/ml - Primary     I shared with the patient that his PSA has gone from 5-5 9 with a percent free PSA of around 6  As such there is a higher chance that he has clinically significant prostate cancer present upon prostate biopsy  We reviewed the pre, liliam, and postoperative care for prostate biopsy as well as the preparation for this procedure in the office and avoidance of blood thinners and anti-platelet agents  Plan:  He wishes to proceed to biopsy and this will be scheduled in the coming weeks         Relevant Medications    bisacodyl (FLEET) 10 MG/30ML ENEM    ciprofloxacin (CIPRO) 500 mg tablet    magnesium citrate (CITROMA) 1 745 g/30 mL oral solution                Assessment and plan:       Please see problem oriented charting for the assessment plan of today's urological complaints     Flako Starr MD      Chief Complaint     Chief Complaint   Patient presents with    Elevated PSA         History of Present Illness     Evan Luna is a 62 y o  gentleman that I have seen previously for elevated PSA  His PSA was 5 in May, we recheck this to make sure that this was not an outlier value in his PSA has increased again to 5 9 with a low free percentage of free PSA indicating a higher risk for clinically significant prostate cancer  AUA symptom score is 1 with a bother score of 0      On review of systems today he denies dysuria, incontinence, hesitancy of urination, gross hematuria, urgency, nocturia once per night, he feels empty after voiding and stream quality is good  The following portions of the patient's history were reviewed and updated as appropriate: allergies, current medications, past family history, past medical history, past social history, past surgical history and problem list     Detailed Urologic History     - please refer to HPI    Review of Systems     Review of Systems   Constitutional: Negative  HENT: Negative  Eyes: Negative  Respiratory: Negative  Cardiovascular: Negative  Gastrointestinal: Negative  Endocrine: Negative  Genitourinary:        As per HPI   Musculoskeletal: Negative  Skin: Negative  Allergic/Immunologic: Negative  Neurological: Negative  Hematological: Negative  Psychiatric/Behavioral: Negative  Allergies     No Known Allergies    Physical Exam     Physical Exam   Constitutional: He is oriented to person, place, and time  He appears well-developed and well-nourished  No distress  HENT:   Head: Normocephalic and atraumatic  Right Ear: External ear normal    Left Ear: External ear normal    Nose: Nose normal    Eyes: Pupils are equal, round, and reactive to light  Conjunctivae and EOM are normal  Right eye exhibits no discharge  Left eye exhibits no discharge  No scleral icterus  Neck: Neck supple  Cardiovascular: Intact distal pulses  Pulmonary/Chest: Effort normal  No stridor  No respiratory distress  He has no wheezes  Abdominal: He exhibits no distension  Musculoskeletal: Normal range of motion  He exhibits no edema or deformity  Neurological: He is alert and oriented to person, place, and time  No cranial nerve deficit  Coordination normal    Skin: Skin is warm and dry  No rash noted  He is not diaphoretic  No erythema  No pallor  Psychiatric: He has a normal mood and affect   His behavior is normal  Judgment and thought content normal    Nursing note and vitals reviewed  Vital Signs  Vitals:    09/28/18 1117   BP: 132/80   BP Location: Left arm   Patient Position: Sitting   Cuff Size: Adult   Pulse: 74   Weight: 103 kg (226 lb)   Height: 5' 8 75" (1 746 m)         Current Medications       Current Outpatient Prescriptions:     amLODIPine (NORVASC) 10 mg tablet, 10 mg, Disp: , Rfl:     losartan-hydrochlorothiazide (HYZAAR) 100-25 MG per tablet, , Disp: , Rfl:     metFORMIN (GLUCOPHAGE-XR) 500 mg 24 hr tablet, 500 mg, Disp: , Rfl:     valsartan-hydrochlorothiazide (DIOVAN-HCT) 320-12 5 MG per tablet, 320 tablets, Disp: , Rfl:     bisacodyl (FLEET) 10 MG/30ML ENEM, Insert 30 mL (10 mg total) into the rectum once for 1 dose Use this enema the morning of your prostate biopsy, Disp: 1 enema, Rfl: 0    ciprofloxacin (CIPRO) 500 mg tablet, Take 1 tablet (500 mg total) by mouth every 12 (twelve) hours for 3 days Start this medication the day prior to your prostate biopsy, Disp: 6 tablet, Rfl: 0    magnesium citrate (CITROMA) 1 745 g/30 mL oral solution, Take 296 mL by mouth once for 1 dose Take this medication the night prior to your prostate biopsy, Disp: 1 Bottle, Rfl: 0      Active Problems     Patient Active Problem List   Diagnosis    Elevated PSA, less than 10 ng/ml         Past Medical History     Past Medical History:   Diagnosis Date    Snores          Surgical History     History reviewed  No pertinent surgical history  Family History     History reviewed  No pertinent family history        Social History     Social History     History   Smoking Status    Never Smoker   Smokeless Tobacco    Never Used         Pertinent Lab Values     No results found for: CREATININE    Lab Results   Component Value Date    PSA 5 9 (H) 09/10/2018             Pertinent Imaging      There is no pertinent urological imaging for my review

## 2018-11-17 NOTE — PATIENT INSTRUCTIONS
Prostate Gland Needle Biopsy   WHAT YOU NEED TO KNOW:   A prostate gland needle biopsy is a procedure to remove samples of tissue from your prostate gland  The prostate is a gland in men located just below the bladder and surrounds the urethra (tube that carries urine out of the body)  After the samples are removed, they are sent to a lab and tested for cancer  DISCHARGE INSTRUCTIONS:   Medicines:   · Antibiotics: This medicine is given to help treat or prevent an infection caused by bacteria  · Pain medicine: You may be given a prescription medicine to decrease pain  Do not wait until the pain is severe before you take this medicine  · Take your medicine as directed  Contact your healthcare provider if you think your medicine is not helping or if you have side effects  Tell him or her if you are allergic to any medicine  Keep a list of the medicines, vitamins, and herbs you take  Include the amounts, and when and why you take them  Bring the list or the pill bottles to follow-up visits  Carry your medicine list with you in case of an emergency  Follow up with your healthcare provider or urologist as directed: You may need to return for more tests or procedures  Write down your questions so you remember to ask them during your visits  Self-care:   · Eat healthy foods:  Healthy foods include fruits, vegetables, whole-grain breads, low-fat dairy products, beans, lean meats, and fish  Eat foods low in animal fat and saturated fats to help decrease your risk for prostate cancer  · Limit alcohol:  You should limit alcohol to 2 drinks a day  A drink of alcohol is 12 ounces of beer, 5 ounces of wine, or 1½ ounces of liquor  Contact your healthcare provider or urologist if:   · You cannot get an erection  · You feel pain or burning when you urinate  · You feel weak, and your face is hot and red  · You have a fever or chills      · You see blood in your urine, bowel movements, or semen     · Your urine is cloudy or smells foul  · You have questions or concerns about your condition or care  Seek care immediately or call 911 if:   · You bleed from your rectum  · You urinate very little or not at all  · You have pain from your procedure that gets worse, even after you take pain medicine  © 2017 [facility] Information is for End User's use only and may not be sold, redistributed or otherwise used for commercial purposes  All illustrations and images included in CareNotes® are the copyrighted property of [employer]  or [employer]  The above information is an  only  It is not intended as medical advice for individual conditions or treatments  Talk to your doctor, nurse or pharmacist before following any medical regimen to see if it is safe and effective for you

## 2018-11-17 NOTE — PROGRESS NOTES
Office TRUS-guided Prostate Biopsy Procedure Note    Indication    Elevated PSA (PSA 5 9 with a free PSA percentage of 6)    Informed consent   The risks, benefits and alternatives to TRUS-guided prostate biopsy were conveyed to the patient prior to performing the procedure  A discussion of the risks of the procedure included, but was not limited to: pain, hematuria, hematochezia, hematospermia, infection, and the possibility of a non-diagnostic biopsy  The patient was given the opportunity to have his questions answered and there was no perceived barrier to education  Antibiotic prophylaxis   The patient received the following antibiotics at least 30 minutes prior to undergoing biopsy: Ciprofloxacin 500 mg PO  The patient was instructed to continue taking the antibiotics as prescribed for a total of 3 days, including the day of biopsy  Rectal cleansing  The patient was instructed to perform an evacuating rectal enema 1-2 hours prior to biopsy  Local anesthesia  Topical 2% lidocaine jelly was applied liberally to the anus and rectum and allowed to dwell for at least 5 min prior to starting the procedure  After insertion of the TRUS probe, 10 mL of 2% lidocaine solution was injected with ultrasound guidance at the  junction of the prostate and seminal vesicles  The anesthetic was allowed to dwell for at least 2 minutes prior to biopsy  Transrectal ultrasonography  The patient was placed in the left lateral decubitus position  After an attentive digital rectal examination, a 7 5 mHz sidefire ultrasound probe was gently inserted into the rectum and biplanar imaging of the prostate was done with the findings noted below  Images were taken of any abnormal findings and also to document prostate size      Bladder  The bladder base appeared normal     Prostate  Digital rectal exam findings:  - approximately 20 gram prostate    Ultrasound size measurements:  -Volume:  18 62 cm3    Ultrasound findings:  -Cysts: None  -Masses: None, there were some dystrophic calcifications of the prostate noted on ultrasound bilaterally  -Median lobe: absent    Clinical stage (assuming a positive biopsy):   -T1c     TRUS-guided needle biopsy  Using an 18 gauge biopsy needle and ultrasound guidance, the following biopsies were taken:    1 core(s) from the left lateral base  1 core(s) from the left lateral mid-gland  1 core(s) from the right middle base  1 core(s) from the right lateral base  1 core(s) from the left lateral mid-gland  1 core(s) from the left middle mid-gland  1 core(s) from the right middle mid-gland  1 core(s) from the right lateral mid-gland  1 core(s) from the left lateral apex  1 core(s) from the left middle apex  1 core(s) from the right middle apex  1 core(s) from the right lateral apex    Total number of cores: 12                Complications  There were no procedural complications  Disposition  The patient was dismissed to home after 30 minutes of observation in stable condition  Post-procedure instructions: Today he underwent an uncomplicated transrectal ultrasound-guided biopsy of the prostate, following a periprosthetic nerve block  I reviewed the normal postprocedure a course including bleeding per recutm, hematuria, and hematospermia  I instructed him to complete his course of antibiotics as prescribed  Instructed him to call with fever greater than 101, chills, nausea, vomiting, poorly controlled pain  His followup was scheduled in approximately 2 weeks' time to review the pathology             Biopsy prostate     Date/Time 11/21/2018 9:56 AM     Performed by  Oracio Manzo     Authorized by Corie Gottron P      Procedure Details   Procedure Notes: Uncomplicated prostate biopsy  Patient Transportation: confirmed  Patient tolerance: Patient tolerated the procedure well with no immediate complications

## 2018-11-21 ENCOUNTER — PROCEDURE VISIT (OUTPATIENT)
Dept: UROLOGY | Facility: CLINIC | Age: 58
End: 2018-11-21
Payer: COMMERCIAL

## 2018-11-21 VITALS
DIASTOLIC BLOOD PRESSURE: 78 MMHG | HEART RATE: 94 BPM | HEIGHT: 70 IN | BODY MASS INDEX: 32.67 KG/M2 | WEIGHT: 228.2 LBS | SYSTOLIC BLOOD PRESSURE: 130 MMHG

## 2018-11-21 DIAGNOSIS — R97.20 ELEVATED PSA: Primary | ICD-10-CM

## 2018-11-21 PROCEDURE — 76942 ECHO GUIDE FOR BIOPSY: CPT | Performed by: UROLOGY

## 2018-11-21 PROCEDURE — 88344 IMHCHEM/IMCYTCHM EA MLT ANTB: CPT | Performed by: PATHOLOGY

## 2018-11-21 PROCEDURE — 55700 PR BIOPSY OF PROSTATE,NEEDLE/PUNCH: CPT | Performed by: UROLOGY

## 2018-11-21 PROCEDURE — G0416 PROSTATE BIOPSY, ANY MTHD: HCPCS | Performed by: PATHOLOGY

## 2018-12-06 NOTE — PROGRESS NOTES
Problem List Items Addressed This Visit     Elevated PSA, less than 10 ng/ml     Seen to be due to prostate cancer, Panchito 6, in 2 cores         Prostate cancer ([address] ) - Primary     I had a long a productive consultation today with Nikkie Pina and with his wife regarding his new diagnosis of prostate cancer  His prostate biopsy shows 2 cores of [place] 3 + 3 equal 6 prostatic adenocarcinoma between 10-50% of each core  One of these is at the apex of the right madyson prostate and 1 is at the base of the left madyson prostate  We talked about management of low risk prostate cancer in the form of active surveillance, surgical and radiation therapies  We discussed the pre, liliam, and postoperative care for robotic assisted laparoscopic radical prostatectomy with bilateral pelvic lymph node dissection as well as the expected hospital stay, need for Womack catheter placement, and the risks of urinary incontinence and erectile dysfunction and the risks of infection, bleeding, pain, damage to surrounding structures, need for additional procedures, risk of failure of the procedure, risk of anesthesia, risk of positioning complications including neurapraxia, chronic pain, and paralysis as well as risk of rhabdomyolysis and compartment syndrome  Risk of deep venous thrombosis and venous thromboembolism as well as pneumonia and other potential unpredictable complications were also discussed with the patient  We talked about radiation therapy and the need for fractionation of the dose, and potential long-term side effects of radiation therapy including stricture formation, decreased blood flow to pelvic organs, risk of secondary malignancy, and risk of radiation cystitis  I discussed with him the literature and outcomes regarding active surveillance for low risk prostate cancer with a special focus on the demographic of subjective included in said studies    Both he and his wife understand that many of the studies looking at active surveillance have been performed in study population is where the majority of subjects are   I did describe to them the need for close follow-up in patients electing active surveillance in the form of digital rectal examination, symptom score evaluation, and PSA testing every 3 months  The need for repeat prostate biopsy, usually at 1 year post biopsy was also discussed with them  All of her questions and concerns were answered and addressed  Plan:  The patient wishes to proceed with active surveillance  I believe that this is a reasonable, informed decision  I gave him literature on prostate cancer and on active surveillance today  He will see me back in 3 months for digital rectal examination and PSA evaluation  If PSA continues to go up in the future we will perform a multiparametric MRI prior to repeat biopsy to assess for potential targeted biopsy going forward         Relevant Orders    PSA Total, Diagnostic                  Assessment and plan:       Please see problem oriented charting for the assessment plan of today's urological complaints  Davis Prado MD      Chief Complaint     Chief Complaint   Patient presents with    Elevated PSA     PSA=5 9    Biospy Results    Low risk prostate cancer      History of Present Illness     Javon Barragan is a 62 y o  gentleman that is status post prostate biopsy  He had no fevers, chills, or malaise or major issues after his prostate biopsy  On review of systems today he denies dysuria, incontinence, hesitancy of urination, gross hematuria, urgency, nocturia, he feels empty after voiding and stream quality is good  We had a long discussion today regarding his new diagnosis of low risk prostate cancer  Detailed Urologic History     - please refer to HPI    Review of Systems     Review of Systems   Constitutional: Negative  HENT: Negative  Eyes: Negative  Respiratory: Negative  Cardiovascular: Negative  Gastrointestinal: Negative  Endocrine: Negative  Genitourinary: Negative  Musculoskeletal: Negative  Skin: Negative  Allergic/Immunologic: Negative  Neurological: Negative  Hematological: Negative  Psychiatric/Behavioral: Negative  Allergies     No Known Allergies    Physical Exam     Physical Exam   Constitutional: He is oriented to person, place, and time  He appears well-developed and well-nourished  No distress  HENT:   Head: Normocephalic and atraumatic  Right Ear: External ear normal    Left Ear: External ear normal    Nose: Nose normal    Eyes: Pupils are equal, round, and reactive to light  Conjunctivae and EOM are normal  Right eye exhibits no discharge  Left eye exhibits no discharge  No scleral icterus  Neck: Normal range of motion  Neck supple  Cardiovascular: Regular rhythm and intact distal pulses  Pulmonary/Chest: Effort normal  No stridor  No respiratory distress  He has no wheezes  Musculoskeletal: He exhibits no edema or deformity  Neurological: He is alert and oriented to person, place, and time  No cranial nerve deficit  Coordination normal    Skin: Skin is warm and dry  No rash noted  He is not diaphoretic  No erythema  No pallor  Psychiatric: He has a normal mood and affect  His behavior is normal  Judgment and thought content normal    Nursing note and vitals reviewed            Vital Signs  Vitals:    12/07/18 1128   BP: 158/70   Pulse: 104   Weight: 105 kg (230 lb 6 4 oz)   Height: 5' 10" (1 778 m)         Current Medications       Current Outpatient Prescriptions:     amLODIPine (NORVASC) 10 mg tablet, 10 mg, Disp: , Rfl:     losartan-hydrochlorothiazide (HYZAAR) 100-25 MG per tablet, , Disp: , Rfl:     metFORMIN (GLUCOPHAGE-XR) 500 mg 24 hr tablet, 500 mg, Disp: , Rfl:     rosuvastatin (CRESTOR) 5 mg tablet, , Disp: , Rfl:     bisacodyl (FLEET) 10 MG/30ML ENEM, Insert 30 mL (10 mg total) into the rectum once for 1 dose Use this enema the morning of your prostate biopsy, Disp: 1 enema, Rfl: 0    magnesium citrate (CITROMA) 1 745 g/30 mL oral solution, Take 296 mL by mouth once for 1 dose Take this medication the night prior to your prostate biopsy, Disp: 1 Bottle, Rfl: 0    valsartan-hydrochlorothiazide (DIOVAN-HCT) 320-12 5 MG per tablet, 320 tablets, Disp: , Rfl:       Active Problems     Patient Active Problem List   Diagnosis    Elevated PSA, less than 10 ng/ml    Prostate cancer [facility])         Past Medical History     Past Medical History:   Diagnosis Date    Jane          Surgical History     History reviewed  No pertinent surgical history  Family History     History reviewed  No pertinent family history        Social History     Social History     History   Smoking Status    Never Smoker   Smokeless Tobacco    Never Used         Pertinent Lab Values     No results found for: CREATININE    Lab Results   Component Value Date    PSA 5 9 (H) 09/10/2018             Pertinent Imaging      There is no pertinent urological imaging for my review

## 2018-12-06 NOTE — PATIENT INSTRUCTIONS
The concept of active surveillance, has increasingly emerged since 2010 as a viable option for men who decide not to undergo immediate radical treatment (e g  surgery or radiation therapy)  Similarly to the concept of observation, a lot of prostate cancer is unlikely to harm you or decrease your life expectancy  The concept of active surveillance is not no treatment, but rather to treat you when your cancer warrants treatment (some think of it as deferred treatment)  We now know that men with low risk prostate cancer 10-15 years after their diagnosis who go on active surveillance have remarkably low rates of their disease spreading or dying of prostate cancer  In fact, one study from [facility] in very favorable men followed on active surveillance found that <1% of men had meaningful progression of their disease 15 years later  This demonstrates that for these men there would be no difference in outcomes if they had immediate treatment or underwent active surveillance  The key to these successful numbers though is making sure you are monitored regularly, and that you undergo treatment if your disease becomes more aggressive  During active surveillance, prostate cancer is carefully monitored for signs of progression  A PSA blood test and digital rectal exam (TITO) are usually administered once or twice a year along with a repeat biopsy of the prostate usually every 1-3 years  If there is evidence that the cancer is progressing treatment might be warranted  It is important to note that 50-66% of men during the 10 years after their diagnosis stay on active surveillance and dont warrant treatment  Therefore, the benefit of active surveillance in most men is the avoidance of treatment, and for the rest of the men a delay in having to experience the side effects of treatment      When to Choose Active Surveillance  Current estimates indicate that many more men are aggressively treated for prostate cancer than is necessary to save a life from the disease  Today, the man who is ideal for active surveillance has a low risk prostate cancer (low grade (grade 1 or Panchito 6), PSA <10, and the cancer is confined to the prostate)  Additional factors that are often considered favorable are a low volume of cancer (small amount of cancer found on biopsy, for example)  Additionally, in some men with a low volume of [place] 3 + 4 equal 7 prostate cancer active surveillance can be a good management option as well  Active surveillance might also be a good choice for older men who are not good candidates for observation, but have a limited life expectancy  In addition, if a man is currently battling other serious disorders or diseases, such as heart disease, long-standing high blood pressure, or poorly controlled diabetes, his doctors might feel it is in his best interest to hold off on immediate therapy and avoid its potential complications  Individual Decisions  The right age for active surveillance is a difficult question, as clearly younger men will live longer with their cancers, and thus have a higher likelihood that their cancer could progress  However, in general, younger men appear to have less biologically aggressive cancers and may be able to stay on active surveillance longer  Younger men also have more to lose when it comes to quality of life as they often have better erectile and urinary function  Current national recommendations recommend active surveillance as the treatment of choice for most low risk men  The chance that the man will progress on active surveillance is low, around 30%  In that percentage of men that does progress while on active surveillance they do as well clinically as men who immediately went to treatment    This means that active surveillance is able to safe the majority of men from active treatments which have their own set of side effects including potential urinary incontinence, erectile dysfunction, and the other risks of major surgery including infection, bleeding, pain, damage to surrounding structures, need for additional procedures, and risk of anesthesia and positioning complications  Research is ongoing on how to best use active surveillance for intermediate risk prostate cancer  Decision Aid for Clinically Localized Prostate Cancer   WHAT YOU NEED TO KNOW:   What do I need to know about decisions for clinically localized prostate cancer? You can work with your healthcare provider to make decisions about being screened or treated for prostate cancer  Screening is a test done to find prostate cancer early  Screening is different from diagnosis because screening is used before you first start to have signs or symptoms  This means management or treatment can start early  You can also help plan treatment if cancer is found with screening, or you develop it later on  What do I need to know about clinically localized prostate cancer? · The prostate is a small gland that is part of the reproductive system  It sits around your urethra (tube that carries urine out of your bladder)  Cancer cells start to grow inside the prostate gland  The cells form a mass that continues to grow if left untreated  Clinically localized means that the cancer has not moved beyond the prostate gland  · Prostate cancer is the second most common form of cancer in men (skin cancer is most common)  About 17% of men in the [place] develop prostate cancer  About 3% of men in the [place] die from prostate cancer  · Prostate cancer often grows slowly  Sometimes the tumor does not grow at all  The cancer may not grow quickly enough to be life-threatening in your lifetime  · The risk for prostate cancer increases with age  Your risk is highest if you are older than 65 years  Your risk is lowest if you are younger than 45 years   Your risk is also increased if you have a history of prostate cancer in your father, brother, or son  · You may have no signs or symptoms of prostate cancer until the tumor grows large  You may have trouble urinating or keeping a strong urine stream because of the tumor  At later stages, prostate cancer can spread to your bones or lymph nodes  You may have bone pain or fractures  Am I a good candidate for prostate cancer screening? Screening may be helpful for you if any of the following is true:  · You are 72 years or older  · You have a family history of prostate cancer or other prostate problems  · You do not have another health condition that may prevent you from living longer than another 10 years  · You want to have treatment as early as possible if needed  · You are willing to have screening as often as recommended by your healthcare provider  How is screening done? · A digital rectal exam  is used to check your prostate  Your healthcare provider will insert a gloved finger into your rectum  The provider will be able to feel your prostate for lumps or hard areas that could be tumors  The exam may be repeated over time to check for new or worsening problems  · A PSA test  is used to measure the amount of a protein made by your prostate gland  A blood sample is taken for this test  A high PSA level may be a sign of prostate cancer  What are the benefits and risks of screening? Talk with your healthcare provider about the risks and benefits of screening:  · Benefits  include finding prostate cancer early  Cancer treatment is often more successful when it starts early  This means you can make more decisions about treatment  · Risks  include the following:     ¨ You may have a false belief that you will not develop prostate cancer if your screening result is negative  You can still develop prostate cancer later on  ¨ A PSA test can give a false-negative result  This means the result looks like you do not have cancer even though you do  Treatment or monitoring may be delayed because the tests suggested you do not have cancer  ¨ The PSA test can also give a false-positive result  This means you do not actually have cancer but the test shows you do  You may get more tests, a biopsy, or even treatments that are not needed  It can also be stressful to think you have prostate cancer when you do not  What questions should I ask my healthcare provider to help me make decisions about screening? · How high is my risk for prostate cancer? · How often do I need to have screening? · Where is the screening done? · Do I need to do anything to get ready to have screening? What happens after I have screening? · You will meet with your healthcare provider to go over the results of your screening  · You may need more tests to diagnose anything that showed up on the screening test  Only a biopsy (tissue sample) can show for sure that you have prostate cancer  · After cancer is found, your healthcare provider will assign a number called a Panchito score  The number can help you understand how quickly the cancer is likely to grow, and if it may spread:     ¨ A number of 6 or lower means the cancer is likely to grow more slowly  ¨ A score of 7 means it is likely to grow faster, but it may not spread to other areas  ¨ A score of 8 to 10 means it is likely to grow more quickly and also spread  · Your healthcare provider will also assign a T stage to the tumor  This number shows the growth of the tumor and if it is likely to spread to other areas  · You and your healthcare provider will use the Panchito score, T stage, and PSA results to talk about your treatment options  Your provider will tell you if your prostate cancer is at low, medium, or high risk for growing and spreading  The need for more tests and the range of treatment options depend on the risk level   Together you and your provider can create a treatment plan that is right for you  How is clinically localized prostate cancer treated, and what are the benefits of treatment? · Watchful waiting  means you do not receive treatment right away  If the cancer does not grow or spread, you may never need treatment  Watchful waiting may be used if your tumor risk is low  The benefit of this option is that you will not have invasive or difficult treatment  You can choose a different treatment option if tests show the tumor is growing or spreading over time  · Active surveillance  also means you do not receive treatment right away  You will need to have tests over time to continue to check the tumor  A benefit of this option is that follow-up tests can show a change early  Treatment options may be less invasive than if the tumor is found at a later stage  · Cryoablation  is used to kill cancer cells by freezing them  This is a less invasive treatment  You may have little or no pain after this procedure  · Radiation  may be used to destroy the cancer cells  Radiation is about as effective as surgery to remove the prostate gland  This treatment leaves the prostate in place and only targets the cancer cells  · Surgery  is used to remove the prostate gland  The tumor is in the gland, so it will be removed along with the gland  · Hormone therapy  may be added to surgery or radiation treatment  Your testosterone level is lowered, or it is blocked  This can stop the cancer cells from growing, or slow the growth  Hormone therapy may be given as an injection every 1 to 6 months  Another way to lower your testosterone level is to have surgery to remove your testicles  Your body will no longer make testosterone if your testicles are removed  What are the risks of treatment? · Watchful waiting and active surveillance  can cause you to worry that the cancer is growing or spreading  · Surgery  can damage tissue around your prostate   Surgery can increase your risk for trouble urinating, incontinence (leaking), or urinary retention  Surgery can also cause problems with your ability to have or keep an erection  You may bleed more than expected during surgery or develop an infection  You may also need to be treated again if the surgery you have does not relieve your symptoms  Surgery may not be able to remove all the tumor cells  · Radiation  may not kill all the cancer cells  Radiation can increase your risk for trouble urinating, incontinence (leaking), or urinary retention  You may have temporary or permanent hair loss in your scrotum  Your skin can become dry or irritated  You may develop problems urinating or having a bowel movement  Radiation can also cause problems with your ability to have or keep an erection  · Cryoablation  may not kill all the cancer cells  You may develop scar tissue  You can also have swelling, problems urinating, or pain in your pelvis or scrotum  Tissue outside the prostate may be damaged during cryoablation  You may have permanent erection problems  What questions should I ask my healthcare provider to help me make decisions about treatment? · What is my Clawson score, T score, and risk number? · How often will I need follow-up tests if I choose active surveillance first?    · How will each treatment option affect my daily activities? · What is the risk for erectile dysfunction or impotence with each treatment? · Am I a good candidate for surgery? · Which surgery may work best to treat my symptoms? · Where is the surgery done? · How long is recovery after surgery? · Will my insurance cover treatment? What do I need to think about to help me make decisions about treatment? · If my cancer risk is low, will I be comfortable with watchful waiting or active surveillance instead of immediate treatment? How will I feel if the cancer grows or spreads?     · Will I go in for follow-up tests over time if I do not want treatment right away?    · If I have treatment and lose my ability to have an erection, how will I feel? · Am I willing to accept problems with urinating or having a bowel movement that might happen with treatment? · How will my family or others in my life be affected by my treatment decisions? CARE AGREEMENT:   You have the right to help plan your care  Learn about your health condition and how it may be treated  Discuss treatment options with your caregivers to decide what care you want to receive  You always have the right to refuse treatment  The above information is an  only  It is not intended as medical advice for individual conditions or treatments  Talk to your doctor, nurse or pharmacist before following any medical regimen to see if it is safe and effective for you  © 2017 [employer] Information is for End User's use only and may not be sold, redistributed or otherwise used for commercial purposes  All illustrations and images included in CareNotes® are the copyrighted property of [employer]  or Robinson Prado

## 2018-12-07 ENCOUNTER — OFFICE VISIT (OUTPATIENT)
Dept: UROLOGY | Facility: CLINIC | Age: 58
End: 2018-12-07
Payer: COMMERCIAL

## 2018-12-07 VITALS
DIASTOLIC BLOOD PRESSURE: 70 MMHG | SYSTOLIC BLOOD PRESSURE: 158 MMHG | WEIGHT: 230.4 LBS | HEIGHT: 70 IN | BODY MASS INDEX: 32.99 KG/M2 | HEART RATE: 104 BPM

## 2018-12-07 DIAGNOSIS — C61 PROSTATE CANCER (HCC): Primary | ICD-10-CM

## 2018-12-07 DIAGNOSIS — R97.20 ELEVATED PSA, LESS THAN 10 NG/ML: ICD-10-CM

## 2018-12-07 PROCEDURE — 99215 OFFICE O/P EST HI 40 MIN: CPT | Performed by: UROLOGY

## 2018-12-07 RX ORDER — ROSUVASTATIN CALCIUM 5 MG/1
TABLET, COATED ORAL
COMMUNITY
Start: 2018-11-28

## 2018-12-07 NOTE — ASSESSMENT & PLAN NOTE
I had a long a productive consultation today with Maciel Sheldon and with his wife regarding his new diagnosis of prostate cancer  His prostate biopsy shows 2 cores of [place] 3 + 3 equal 6 prostatic adenocarcinoma between 10-50% of each core  One of these is at the apex of the right madyson prostate and 1 is at the base of the left madyson prostate  We talked about management of low risk prostate cancer in the form of active surveillance, surgical and radiation therapies  We discussed the pre, liliam, and postoperative care for robotic assisted laparoscopic radical prostatectomy with bilateral pelvic lymph node dissection as well as the expected hospital stay, need for Womack catheter placement, and the risks of urinary incontinence and erectile dysfunction and the risks of infection, bleeding, pain, damage to surrounding structures, need for additional procedures, risk of failure of the procedure, risk of anesthesia, risk of positioning complications including neurapraxia, chronic pain, and paralysis as well as risk of rhabdomyolysis and compartment syndrome  Risk of deep venous thrombosis and venous thromboembolism as well as pneumonia and other potential unpredictable complications were also discussed with the patient  We talked about radiation therapy and the need for fractionation of the dose, and potential long-term side effects of radiation therapy including stricture formation, decreased blood flow to pelvic organs, risk of secondary malignancy, and risk of radiation cystitis  I discussed with him the literature and outcomes regarding active surveillance for low risk prostate cancer with a special focus on the demographic of subjective included in said studies  Both he and his wife understand that many of the studies looking at active surveillance have been performed in study population is where the majority of subjects are       I did describe to them the need for close follow-up in patients electing active surveillance in the form of digital rectal examination, symptom score evaluation, and PSA testing every 3 months  The need for repeat prostate biopsy, usually at 1 year post biopsy was also discussed with them  All of her questions and concerns were answered and addressed  Plan:  The patient wishes to proceed with active surveillance  I believe that this is a reasonable, informed decision  I gave him literature on prostate cancer and on active surveillance today  He will see me back in 3 months for digital rectal examination and PSA evaluation    If PSA continues to go up in the future we will perform a multiparametric MRI prior to repeat biopsy to assess for potential targeted biopsy going forward

## 2018-12-07 NOTE — LETTER
December 7, 2018     Dil[place], [address] [facility] [address]    Patient: Tyler Daniel   YOB: 1960   Date of Visit: 12/7/2018       Dear Dr Yaritza Latif: Thank you for referring Tyler Daniel to me for evaluation  Below are my notes for this consultation  If you have questions, please do not hesitate to call me  I look forward to following your patient along with you  Sincerely,        Humza Martinez MD        CC: No Recipients  Humza Martinez MD  12/7/2018 12:17 PM  Sign at close encounter       Problem List Items Addressed This Visit     Elevated PSA, less than 10 ng/ml     Seen to be due to prostate cancer, [place] 6, in 2 cores         Prostate cancer ([address] ) - Primary     I had a long a productive consultation today with Tay Godwin and with his wife regarding his new diagnosis of prostate cancer  His prostate biopsy shows 2 cores of [place] 3 + 3 equal 6 prostatic adenocarcinoma between 10-50% of each core  One of these is at the apex of the right madyson prostate and 1 is at the base of the left madyson prostate  We talked about management of low risk prostate cancer in the form of active surveillance, surgical and radiation therapies  We discussed the pre, liliam, and postoperative care for robotic assisted laparoscopic radical prostatectomy with bilateral pelvic lymph node dissection as well as the expected hospital stay, need for Womack catheter placement, and the risks of urinary incontinence and erectile dysfunction and the risks of infection, bleeding, pain, damage to surrounding structures, need for additional procedures, risk of failure of the procedure, risk of anesthesia, risk of positioning complications including neurapraxia, chronic pain, and paralysis as well as risk of rhabdomyolysis and compartment syndrome    Risk of deep venous thrombosis and venous thromboembolism as well as pneumonia and other potential unpredictable complications were also discussed with the patient  We talked about radiation therapy and the need for fractionation of the dose, and potential long-term side effects of radiation therapy including stricture formation, decreased blood flow to pelvic organs, risk of secondary malignancy, and risk of radiation cystitis  I discussed with him the literature and outcomes regarding active surveillance for low risk prostate cancer with a special focus on the demographic of subjective included in said studies  Both he and his wife understand that many of the studies looking at active surveillance have been performed in study population is where the majority of subjects are   I did describe to them the need for close follow-up in patients electing active surveillance in the form of digital rectal examination, symptom score evaluation, and PSA testing every 3 months  The need for repeat prostate biopsy, usually at 1 year post biopsy was also discussed with them  All of her questions and concerns were answered and addressed  Plan:  The patient wishes to proceed with active surveillance  I believe that this is a reasonable, informed decision  I gave him literature on prostate cancer and on active surveillance today  He will see me back in 3 months for digital rectal examination and PSA evaluation  If PSA continues to go up in the future we will perform a multiparametric MRI prior to repeat biopsy to assess for potential targeted biopsy going forward         Relevant Orders    PSA Total, Diagnostic                  Assessment and plan:       Please see problem oriented charting for the assessment plan of today's urological complaints  Teresa White MD      Chief Complaint     Chief Complaint   Patient presents with    Elevated PSA     PSA=5 9    Biospy Results    Low risk prostate cancer      History of Present Illness     Davy Britt is a 62 y o  gentleman that is status post prostate biopsy    He had no fevers, chills, or malaise or major issues after his prostate biopsy  On review of systems today he denies dysuria, incontinence, hesitancy of urination, gross hematuria, urgency, nocturia, he feels empty after voiding and stream quality is good  We had a long discussion today regarding his new diagnosis of low risk prostate cancer  Detailed Urologic History     - please refer to HPI    Review of Systems     Review of Systems   Constitutional: Negative  HENT: Negative  Eyes: Negative  Respiratory: Negative  Cardiovascular: Negative  Gastrointestinal: Negative  Endocrine: Negative  Genitourinary: Negative  Musculoskeletal: Negative  Skin: Negative  Allergic/Immunologic: Negative  Neurological: Negative  Hematological: Negative  Psychiatric/Behavioral: Negative  Allergies     No Known Allergies    Physical Exam     Physical Exam   Constitutional: He is oriented to person, place, and time  He appears well-developed and well-nourished  No distress  HENT:   Head: Normocephalic and atraumatic  Right Ear: External ear normal    Left Ear: External ear normal    Nose: Nose normal    Eyes: Pupils are equal, round, and reactive to light  Conjunctivae and EOM are normal  Right eye exhibits no discharge  Left eye exhibits no discharge  No scleral icterus  Neck: Normal range of motion  Neck supple  Cardiovascular: Regular rhythm and intact distal pulses  Pulmonary/Chest: Effort normal  No stridor  No respiratory distress  He has no wheezes  Musculoskeletal: He exhibits no edema or deformity  Neurological: He is alert and oriented to person, place, and time  No cranial nerve deficit  Coordination normal    Skin: Skin is warm and dry  No rash noted  He is not diaphoretic  No erythema  No pallor  Psychiatric: He has a normal mood and affect  His behavior is normal  Judgment and thought content normal    Nursing note and vitals reviewed            Vital Signs  Vitals:    12/07/18 1128 BP: 158/70   Pulse: 104   Weight: 105 kg (230 lb 6 4 oz)   Height: 5' 10" (1 778 m)         Current Medications       Current Outpatient Prescriptions:     amLODIPine (NORVASC) 10 mg tablet, 10 mg, Disp: , Rfl:     losartan-hydrochlorothiazide (HYZAAR) 100-25 MG per tablet, , Disp: , Rfl:     metFORMIN (GLUCOPHAGE-XR) 500 mg 24 hr tablet, 500 mg, Disp: , Rfl:     rosuvastatin (CRESTOR) 5 mg tablet, , Disp: , Rfl:     bisacodyl (FLEET) 10 MG/30ML ENEM, Insert 30 mL (10 mg total) into the rectum once for 1 dose Use this enema the morning of your prostate biopsy, Disp: 1 enema, Rfl: 0    magnesium citrate (CITROMA) 1 745 g/30 mL oral solution, Take 296 mL by mouth once for 1 dose Take this medication the night prior to your prostate biopsy, Disp: 1 Bottle, Rfl: 0    valsartan-hydrochlorothiazide (DIOVAN-HCT) 320-12 5 MG per tablet, 320 tablets, Disp: , Rfl:       Active Problems     Patient Active Problem List   Diagnosis    Elevated PSA, less than 10 ng/ml    Prostate cancer [facility])         Past Medical History     Past Medical History:   Diagnosis Date    Snores          Surgical History     History reviewed  No pertinent surgical history  Family History     History reviewed  No pertinent family history        Social History     Social History     History   Smoking Status    Never Smoker   Smokeless Tobacco    Never Used         Pertinent Lab Values     No results found for: CREATININE    Lab Results   Component Value Date    PSA 5 9 (H) 09/10/2018             Pertinent Imaging      There is no pertinent urological imaging for my review

## 2019-03-02 ENCOUNTER — APPOINTMENT (OUTPATIENT)
Dept: LAB | Facility: CLINIC | Age: 59
End: 2019-03-02
Payer: COMMERCIAL

## 2019-03-02 DIAGNOSIS — C61 PROSTATE CANCER (HCC): ICD-10-CM

## 2019-03-02 LAB — PSA SERPL-MCNC: 6 NG/ML (ref 0–4)

## 2019-03-02 PROCEDURE — 84153 ASSAY OF PSA TOTAL: CPT

## 2019-03-06 NOTE — PATIENT INSTRUCTIONS
The concept of active surveillance, has increasingly emerged since 2010 as a viable option for men who decide not to undergo immediate radical treatment (e g  surgery or radiation therapy)  Similarly to the concept of observation, a lot of prostate cancer is unlikely to harm you or decrease your life expectancy  The concept of active surveillance is not no treatment, but rather to treat you when your cancer warrants treatment (some think of it as deferred treatment)  We now know that men with low risk prostate cancer 10-15 years after their diagnosis who go on active surveillance have remarkably low rates of their disease spreading or dying of prostate cancer  In fact, one study from [facility] in very favorable men followed on active surveillance found that <1% of men had meaningful progression of their disease 15 years later  This demonstrates that for these men there would be no difference in outcomes if they had immediate treatment or underwent active surveillance  The key to these successful numbers though is making sure you are monitored regularly, and that you undergo treatment if your disease becomes more aggressive  During active surveillance, prostate cancer is carefully monitored for signs of progression  A PSA blood test and digital rectal exam (TITO) are usually administered once or twice a year along with a repeat biopsy of the prostate usually every 1-3 years  If there is evidence that the cancer is progressing treatment might be warranted  It is important to note that 50-66% of men during the 10 years after their diagnosis stay on active surveillance and dont warrant treatment  Therefore, the benefit of active surveillance in most men is the avoidance of treatment, and for the rest of the men a delay in having to experience the side effects of treatment      When to Choose Active Surveillance  Current estimates indicate that many more men are aggressively treated for prostate cancer than is necessary to save a life from the disease  Today, the man who is ideal for active surveillance has a low risk prostate cancer (low grade (grade 1 or [place] 6), PSA <10, and the cancer is confined to the prostate)  Additional factors that are often considered favorable are a low volume of cancer (small amount of cancer found on biopsy, for example)  Additionally, in some men with a low volume of [place] 3 + 4 equal 7 prostate cancer active surveillance can be a good management option as well  Active surveillance might also be a good choice for older men who are not good candidates for observation, but have a limited life expectancy  In addition, if a man is currently battling other serious disorders or diseases, such as heart disease, long-standing high blood pressure, or poorly controlled diabetes, his doctors might feel it is in his best interest to hold off on immediate therapy and avoid its potential complications  Individual Decisions  The right age for active surveillance is a difficult question, as clearly younger men will live longer with their cancers, and thus have a higher likelihood that their cancer could progress  However, in general, younger men appear to have less biologically aggressive cancers and may be able to stay on active surveillance longer  Younger men also have more to lose when it comes to quality of life as they often have better erectile and urinary function  Current national recommendations recommend active surveillance as the treatment of choice for most low risk men  The chance that the man will progress on active surveillance is low, around 30%  In that percentage of men that does progress while on active surveillance they do as well clinically as men who immediately went to treatment    This means that active surveillance is able to safe the majority of men from active treatments which have their own set of side effects including potential urinary incontinence, erectile dysfunction, and the other risks of major surgery including infection, bleeding, pain, damage to surrounding structures, need for additional procedures, and risk of anesthesia and positioning complications  Research is ongoing on how to best use active surveillance for intermediate risk prostate cancer

## 2019-03-06 NOTE — PROGRESS NOTES
Problem List Items Addressed This Visit        Genitourinary    Prostate cancer ([facility] [address] ) - Primary     [employer] is doing quite well  He has made positive changes in his diet and lifestyle he has lost some weight and is feeling better  His PSA is 6 indicating no worsening of his prostate cancer burden at this time, was previously 5 9  AUA symptom score is 1 with a bother score of 3  His digital rectal examination today shows a approximately 30-40 gram gland which is smooth and without nodules  I reviewed with him the current AUA guidelines that recommend active surveillance visits no greater than every 6 months with a PSA prior  Plan: We will see him in 6 months with a PSA prior, at that time we will arrange for his per protocol biopsy 4 November 2019  He will receive intramuscular gentamicin as well as ciprofloxacin for that biopsy          Relevant Orders    PSA Total, Diagnostic       Other    Elevated PSA, less than 10 ng/ml     Due to low risk prostate cancer, PSA is 6, essentially stable from 5 9 previously                 Discussion:  Should he have a worrisome change in his PSA or digital rectal examination the next step would be for MRI of the prostate, multiparametric, in planning for potential targeted prostate biopsy  If no worrisome changes in this way, next systematic prostate biopsy per protocol would be due in November 2019    Over 20 minutes were spent face-to-face with him and with his wife in counseling and coordination of care today    Assessment and plan:       Please see problem oriented charting for the assessment plan of today's urological complaints    Nusrat Ortega MD      Chief Complaint     Chief Complaint   Patient presents with    Elevated PSA    Prostate Cancer         History of Present Illness     Germain Saleh is a 62 y o  gentleman that I have seen previously for prostate cancer on active surveillance    Last prostate biopsy was in November 2018 and showed 2 cores of [place] 3+3=6 prostate cancer  Since that time he has been doing quite well and has made positive changes in his diet and lifestyle  His energy levels are good, he has no fever, no bone pain, and no systemic be symptoms    In terms of new complaints he has none    Recently his PSA was seen to be 6 from a previous value of 5 9  This is stable  Is doing quite well on active surveillance  The following portions of the patient's history were reviewed and updated as appropriate: allergies, current medications, past family history, past medical history, past social history, past surgical history and problem list     Detailed Urologic History     - please refer to HPI    Review of Systems     Review of Systems   Constitutional: Negative  HENT: Negative  Eyes: Negative  Respiratory: Negative  Cardiovascular: Negative  Gastrointestinal: Negative  Endocrine: Negative  Genitourinary:        As per HPI   Musculoskeletal: Negative  Skin: Negative  Allergic/Immunologic: Negative  Neurological: Negative  Hematological: Negative  Psychiatric/Behavioral: Negative  Allergies     No Known Allergies    Physical Exam     Physical Exam   Constitutional: He is oriented to person, place, and time  He appears well-developed and well-nourished  No distress  HENT:   Head: Normocephalic and atraumatic  Right Ear: External ear normal    Left Ear: External ear normal    Nose: Nose normal    Eyes: Pupils are equal, round, and reactive to light  Conjunctivae and EOM are normal  Right eye exhibits no discharge  Left eye exhibits no discharge  No scleral icterus  Neck: Normal range of motion  Neck supple  No tracheal deviation present  No thyromegaly present  Cardiovascular: Regular rhythm and intact distal pulses  Pulmonary/Chest: Effort normal  No stridor  No respiratory distress  He has no wheezes  Abdominal: Soft  He exhibits no distension and no mass  There is no tenderness  There is no rebound and no guarding  No hernia  Genitourinary:   Genitourinary Comments: Prostate is 35 grams and smooth without nodules, no concerning changes   Musculoskeletal: He exhibits no edema, tenderness or deformity  Neurological: He is alert and oriented to person, place, and time  No cranial nerve deficit  Coordination normal    Skin: Skin is warm and dry  No rash noted  He is not diaphoretic  No erythema  No pallor  Psychiatric: He has a normal mood and affect  His behavior is normal  Judgment and thought content normal    Nursing note and vitals reviewed  Vital Signs  Vitals:    03/08/19 1021   BP: 122/84   BP Location: Left arm   Patient Position: Sitting   Cuff Size: Standard   Pulse: 80   Weight: 98 3 kg (216 lb 12 8 oz)   Height: 5' 10" (1 778 m)         Current Medications       Current Outpatient Medications:     amLODIPine (NORVASC) 10 mg tablet, 10 mg, Disp: , Rfl:     losartan-hydrochlorothiazide (HYZAAR) 100-25 MG per tablet, , Disp: , Rfl:     metFORMIN (GLUCOPHAGE-XR) 500 mg 24 hr tablet, 500 mg, Disp: , Rfl:     rosuvastatin (CRESTOR) 5 mg tablet, , Disp: , Rfl:     valsartan-hydrochlorothiazide (DIOVAN-HCT) 320-12 5 MG per tablet, 320 tablets, Disp: , Rfl:       Active Problems     Patient Active Problem List   Diagnosis    Elevated PSA, less than 10 ng/ml    Prostate cancer [facility])         Past Medical History     Past Medical History:   Diagnosis Date    Snores          Surgical History     History reviewed  No pertinent surgical history  Family History     History reviewed  No pertinent family history        Social History     Social History     Social History     Tobacco Use   Smoking Status Never Smoker   Smokeless Tobacco Never Used         Pertinent Lab Values     No results found for: CREATININE    Lab Results   Component Value Date    PSA 6 0 (H) 03/02/2019    PSA 5 9 (H) 09/10/2018             Pertinent Imaging      There is no new imaging for my review

## 2019-03-08 ENCOUNTER — OFFICE VISIT (OUTPATIENT)
Dept: UROLOGY | Facility: CLINIC | Age: 59
End: 2019-03-08
Payer: COMMERCIAL

## 2019-03-08 VITALS
BODY MASS INDEX: 31.04 KG/M2 | DIASTOLIC BLOOD PRESSURE: 84 MMHG | HEIGHT: 70 IN | HEART RATE: 80 BPM | WEIGHT: 216.8 LBS | SYSTOLIC BLOOD PRESSURE: 122 MMHG

## 2019-03-08 DIAGNOSIS — C61 PROSTATE CANCER (HCC): Primary | ICD-10-CM

## 2019-03-08 DIAGNOSIS — R97.20 ELEVATED PSA, LESS THAN 10 NG/ML: ICD-10-CM

## 2019-03-08 PROCEDURE — 99214 OFFICE O/P EST MOD 30 MIN: CPT | Performed by: UROLOGY

## 2019-03-08 NOTE — ASSESSMENT & PLAN NOTE
Kennedy Akbar is doing quite well  He has made positive changes in his diet and lifestyle he has lost some weight and is feeling better  His PSA is 6 indicating no worsening of his prostate cancer burden at this time, was previously 5 9  AUA symptom score is 1 with a bother score of 3  His digital rectal examination today shows a approximately 30-40 gram gland which is smooth and without nodules  I reviewed with him the current AUA guidelines that recommend active surveillance visits no greater than every 6 months with a PSA prior  Plan: We will see him in 6 months with a PSA prior, at that time we will arrange for his per protocol biopsy 4 November 2019   He will receive intramuscular gentamicin as well as ciprofloxacin for that biopsy

## 2019-03-08 NOTE — LETTER
March 8, 2019     Maureen Vigil MD  [address] [id number]    Patient: Youlanda Koyanagi   YOB: 1960   Date of Visit: 3/8/2019       Dear Dr Red Batista: Thank you for referring Youlanda Koyanagi to me for evaluation  Below are my notes for this consultation  If you have questions, please do not hesitate to call me  I look forward to following your patient along with you  Sincerely,        Jeevan Brown MD        CC: No Recipients  Jeevan Brown MD  3/8/2019 10:43 AM  Sign at close encounter       Problem List Items Addressed This Visit        Genitourinary    Prostate cancer [facility]) - Primary     Maciel Chung is doing quite well  He has made positive changes in his diet and lifestyle he has lost some weight and is feeling better  His PSA is 6 indicating no worsening of his prostate cancer burden at this time, was previously 5 9  AUA symptom score is 1 with a bother score of 3  His digital rectal examination today shows a approximately 30-40 gram gland which is smooth and without nodules  I reviewed with him the current AUA guidelines that recommend active surveillance visits no greater than every 6 months with a PSA prior  Plan: We will see him in 6 months with a PSA prior, at that time we will arrange for his per protocol biopsy 4 November 2019  He will receive intramuscular gentamicin as well as ciprofloxacin for that biopsy          Relevant Orders    PSA Total, Diagnostic       Other    Elevated PSA, less than 10 ng/ml     Due to low risk prostate cancer, PSA is 6, essentially stable from 5 9 previously                 Discussion:  Should he have a worrisome change in his PSA or digital rectal examination the next step would be for MRI of the prostate, multiparametric, in planning for potential targeted prostate biopsy    If no worrisome changes in this way, next systematic prostate biopsy per protocol would be due in November 2019      Assessment and plan:       Please see problem oriented charting for the assessment plan of today's urological complaints    Debby Mathew MD      Chief Complaint     Chief Complaint   Patient presents with    Elevated PSA    Prostate Cancer         History of Present Illness     Kushal Modi is a 62 y o  gentleman that I have seen previously for prostate cancer on active surveillance  Last prostate biopsy was in November 2018 and showed 2 cores of Panchito 3+3=6 prostate cancer  Since that time he has been doing quite well and has made positive changes in his diet and lifestyle  His energy levels are good, he has no fever, no bone pain, and no systemic be symptoms    In terms of new complaints he has none    Recently his PSA was seen to be 6 from a previous value of 5 9  This is stable  Is doing quite well on active surveillance  The following portions of the patient's history were reviewed and updated as appropriate: allergies, current medications, past family history, past medical history, past social history, past surgical history and problem list     Detailed Urologic History     - please refer to HPI    Review of Systems     Review of Systems   Constitutional: Negative  HENT: Negative  Eyes: Negative  Respiratory: Negative  Cardiovascular: Negative  Gastrointestinal: Negative  Endocrine: Negative  Genitourinary:        As per HPI   Musculoskeletal: Negative  Skin: Negative  Allergic/Immunologic: Negative  Neurological: Negative  Hematological: Negative  Psychiatric/Behavioral: Negative  Allergies     No Known Allergies    Physical Exam     Physical Exam   Constitutional: He is oriented to person, place, and time  He appears well-developed and well-nourished  No distress  HENT:   Head: Normocephalic and atraumatic  Right Ear: External ear normal    Left Ear: External ear normal    Nose: Nose normal    Eyes: Pupils are equal, round, and reactive to light   Conjunctivae and EOM are normal  Right eye exhibits no discharge  Left eye exhibits no discharge  No scleral icterus  Neck: Normal range of motion  Neck supple  No tracheal deviation present  No thyromegaly present  Cardiovascular: Regular rhythm and intact distal pulses  Pulmonary/Chest: Effort normal  No stridor  No respiratory distress  He has no wheezes  Abdominal: Soft  He exhibits no distension and no mass  There is no tenderness  There is no rebound and no guarding  No hernia  Genitourinary:   Genitourinary Comments: Prostate is 35 grams and smooth without nodules, no concerning changes   Musculoskeletal: He exhibits no edema, tenderness or deformity  Neurological: He is alert and oriented to person, place, and time  No cranial nerve deficit  Coordination normal    Skin: Skin is warm and dry  No rash noted  He is not diaphoretic  No erythema  No pallor  Psychiatric: He has a normal mood and affect  His behavior is normal  Judgment and thought content normal    Nursing note and vitals reviewed  Vital Signs  Vitals:    03/08/19 1021   BP: 122/84   BP Location: Left arm   Patient Position: Sitting   Cuff Size: Standard   Pulse: 80   Weight: 98 3 kg (216 lb 12 8 oz)   Height: 5' 10" (1 778 m)         Current Medications       Current Outpatient Medications:     amLODIPine (NORVASC) 10 mg tablet, 10 mg, Disp: , Rfl:     losartan-hydrochlorothiazide (HYZAAR) 100-25 MG per tablet, , Disp: , Rfl:     metFORMIN (GLUCOPHAGE-XR) 500 mg 24 hr tablet, 500 mg, Disp: , Rfl:     rosuvastatin (CRESTOR) 5 mg tablet, , Disp: , Rfl:     valsartan-hydrochlorothiazide (DIOVAN-HCT) 320-12 5 MG per tablet, 320 tablets, Disp: , Rfl:       Active Problems     Patient Active Problem List   Diagnosis    Elevated PSA, less than 10 ng/ml    Prostate cancer [facility])         Past Medical History     Past Medical History:   Diagnosis Date    ores          Surgical History     History reviewed  No pertinent surgical history        Family History History reviewed  No pertinent family history        Social History     Social History     Social History     Tobacco Use   Smoking Status Never Smoker   Smokeless Tobacco Never Used         Pertinent Lab Values     No results found for: CREATININE    Lab Results   Component Value Date    PSA 6 0 (H) 03/02/2019    PSA 5 9 (H) 09/10/2018             Pertinent Imaging      There is no new imaging for my review

## 2019-08-31 ENCOUNTER — APPOINTMENT (OUTPATIENT)
Dept: LAB | Facility: CLINIC | Age: 59
End: 2019-08-31
Payer: COMMERCIAL

## 2019-08-31 DIAGNOSIS — C61 PROSTATE CANCER (HCC): ICD-10-CM

## 2019-08-31 LAB — PSA SERPL-MCNC: 6.4 NG/ML (ref 0–4)

## 2019-08-31 PROCEDURE — 84153 ASSAY OF PSA TOTAL: CPT

## 2019-09-05 NOTE — PROGRESS NOTES
Problem List Items Addressed This Visit        Genitourinary    Prostate cancer ([facility] [address] )     Patient's PSA is 6 4, this has increased slightly from 6, his examination today shows a 35 gram smooth gland without nodules  He does require an MRI of the prostate given that his PSA is increasing on active surveillance  He will see me in 6 weeks to review his MRI results, at that time we will book him for either a systematic prostate biopsy versus an MRI targeted biopsy         Relevant Orders    MRI prostate multiparametric wo w contrast    Basic metabolic panel       Other    Elevated PSA, less than 10 ng/ml - Primary    Relevant Orders    MRI prostate multiparametric wo w contrast    Basic metabolic panel              Assessment and plan:     Please see problem oriented charting for the assessment plan of today's urological complaints    Stephanie Felix MD      Chief Complaint     Chief Complaint   Patient presents with    Prostate Cancer    Active surveillance of prostate cancer rising PSA status post prostate biopsy showing low risk prostate cancer      History of Present Illness     Ivy Smalls is a 62 y o  gentleman with a history of low risk prostate cancer on active surveillance, most recently his PSA is 6 4, in terms of systemic symptoms today  he denies these, doing well, ECOG performance status of 0      He is due for per protocol biopsy in November of 2019, we will 1st obtain MRI of the prostate prior to systematic biopsy  In terms of new complaints today he has none  On review of systems today he denies dysuria, incontinence, hesitancy of urination, gross hematuria, urgency, nocturia, he feels empty after voiding and stream quality is good       The following portions of the patient's history were reviewed and updated as appropriate: allergies, current medications, past family history, past medical history, past social history, past surgical history and problem list     Detailed Urologic History - please refer to HPI    Review of Systems     Review of Systems   Constitutional: Negative  HENT: Negative  Eyes: Negative  Respiratory: Negative  Cardiovascular: Negative  Gastrointestinal: Negative  Endocrine: Negative  Genitourinary:        As per HPI   Musculoskeletal: Negative  Skin: Negative  Allergic/Immunologic: Negative  Neurological: Negative  Hematological: Negative  Psychiatric/Behavioral: Negative  Allergies     No Known Allergies    Physical Exam     Physical Exam   Constitutional: He is oriented to person, place, and time  He appears well-developed and well-nourished  No distress  HENT:   Head: Normocephalic and atraumatic  Eyes: EOM are normal  Right eye exhibits no discharge  Left eye exhibits no discharge  Neck: No tracheal deviation present  Cardiovascular: Intact distal pulses  Pulmonary/Chest: Effort normal  No stridor  No respiratory distress  Abdominal: Soft  He exhibits no distension and no mass  There is no tenderness  There is no rebound and no guarding  No hernia  Genitourinary:   Genitourinary Comments: Normal sphincter tone, prostate 35 grams and smooth without nodules   Musculoskeletal: He exhibits no edema, tenderness or deformity  Neurological: He is alert and oriented to person, place, and time  No cranial nerve deficit  Coordination normal    Skin: Skin is warm and dry  No rash noted  He is not diaphoretic  No erythema  No pallor  Psychiatric: He has a normal mood and affect  His behavior is normal  Judgment and thought content normal    Nursing note and vitals reviewed            Vital Signs  Vitals:    09/06/19 1036   BP: 134/90   Pulse: 88   Weight: 98 4 kg (217 lb)   Height: 5' 11" (1 803 m)         Current Medications       Current Outpatient Medications:     amLODIPine (NORVASC) 10 mg tablet, 10 mg, Disp: , Rfl:     losartan-hydrochlorothiazide (HYZAAR) 100-25 MG per tablet, , Disp: , Rfl:     metFORMIN (GLUCOPHAGE-XR) 500 mg 24 hr tablet, 500 mg, Disp: , Rfl:     rosuvastatin (CRESTOR) 5 mg tablet, , Disp: , Rfl:     valsartan-hydrochlorothiazide (DIOVAN-HCT) 320-12 5 MG per tablet, 320 tablets, Disp: , Rfl:       Active Problems     Patient Active Problem List   Diagnosis    Elevated PSA, less than 10 ng/ml    Prostate cancer [facility])         Past Medical History     Past Medical History:   Diagnosis Date    ores          Surgical History     History reviewed  No pertinent surgical history  Family History     History reviewed  No pertinent family history        Social History     Social History     Social History     Tobacco Use   Smoking Status Never Smoker   Smokeless Tobacco Never Used         Pertinent Lab Values     No results found for: CREATININE    Lab Results   Component Value Date    PSA 6 4 (H) 08/31/2019    PSA 6 0 (H) 03/02/2019    PSA 5 9 (H) 09/10/2018             Pertinent Imaging      No new imaging for my review

## 2019-09-06 ENCOUNTER — OFFICE VISIT (OUTPATIENT)
Dept: UROLOGY | Facility: CLINIC | Age: 59
End: 2019-09-06
Payer: COMMERCIAL

## 2019-09-06 ENCOUNTER — TELEPHONE (OUTPATIENT)
Dept: UROLOGY | Facility: CLINIC | Age: 59
End: 2019-09-06

## 2019-09-06 ENCOUNTER — APPOINTMENT (OUTPATIENT)
Dept: LAB | Facility: CLINIC | Age: 59
End: 2019-09-06
Payer: COMMERCIAL

## 2019-09-06 VITALS
SYSTOLIC BLOOD PRESSURE: 134 MMHG | WEIGHT: 217 LBS | BODY MASS INDEX: 30.38 KG/M2 | HEART RATE: 88 BPM | DIASTOLIC BLOOD PRESSURE: 90 MMHG | HEIGHT: 71 IN

## 2019-09-06 DIAGNOSIS — C61 PROSTATE CANCER (HCC): ICD-10-CM

## 2019-09-06 DIAGNOSIS — R97.20 ELEVATED PSA, LESS THAN 10 NG/ML: ICD-10-CM

## 2019-09-06 DIAGNOSIS — R97.20 ELEVATED PSA, LESS THAN 10 NG/ML: Primary | ICD-10-CM

## 2019-09-06 LAB
ANION GAP SERPL CALCULATED.3IONS-SCNC: 7 MMOL/L (ref 4–13)
BUN SERPL-MCNC: 15 MG/DL (ref 5–25)
CALCIUM SERPL-MCNC: 9.6 MG/DL (ref 8.3–10.1)
CHLORIDE SERPL-SCNC: 105 MMOL/L (ref 100–108)
CO2 SERPL-SCNC: 31 MMOL/L (ref 21–32)
CREAT SERPL-MCNC: 1.35 MG/DL (ref 0.6–1.3)
GFR SERPL CREATININE-BSD FRML MDRD: 57 ML/MIN/1.73SQ M
GLUCOSE P FAST SERPL-MCNC: 136 MG/DL (ref 65–99)
POTASSIUM SERPL-SCNC: 4.2 MMOL/L (ref 3.5–5.3)
SODIUM SERPL-SCNC: 143 MMOL/L (ref 136–145)

## 2019-09-06 PROCEDURE — 36415 COLL VENOUS BLD VENIPUNCTURE: CPT

## 2019-09-06 PROCEDURE — 80048 BASIC METABOLIC PNL TOTAL CA: CPT

## 2019-09-06 PROCEDURE — 99214 OFFICE O/P EST MOD 30 MIN: CPT | Performed by: UROLOGY

## 2019-09-06 NOTE — TELEPHONE ENCOUNTER
Patient is scheduled on 10/25/19 at 11:45  Patient needs to be sure to schedule MRI at least 1 week prior to this

## 2019-09-06 NOTE — ASSESSMENT & PLAN NOTE
Patient's PSA is 6 4, this has increased slightly from 6, his examination today shows a 35 gram smooth gland without nodules  He does require an MRI of the prostate given that his PSA is increasing on active surveillance      He will see me in 6 weeks to review his MRI results, at that time we will book him for either a systematic prostate biopsy versus an MRI targeted biopsy

## 2019-09-06 NOTE — TELEPHONE ENCOUNTER
Left detailed msg for PT w apt info for 10/25/19 advised PT MRI must be done prior to this date  Asked PT to pls cb w any concerns or questions

## 2019-09-06 NOTE — TELEPHONE ENCOUNTER
Please assist PT sched MRI himself wants to check his insurance , Return in about 6 weeks (around 10/18/2019) for DR VASQUEZ after MRI   Thank you

## 2019-09-30 ENCOUNTER — HOSPITAL ENCOUNTER (OUTPATIENT)
Dept: RADIOLOGY | Age: 59
Discharge: HOME | End: 2019-09-30
Payer: COMMERCIAL

## 2019-09-30 DIAGNOSIS — R97.20 ELEVATED PSA, LESS THAN 10 NG/ML: ICD-10-CM

## 2019-09-30 DIAGNOSIS — C61 PROSTATE CANCER (HCC): ICD-10-CM

## 2019-09-30 PROCEDURE — 76377 3D RENDER W/INTRP POSTPROCES: CPT

## 2019-09-30 PROCEDURE — A9585 GADOBUTROL INJECTION: HCPCS | Performed by: UROLOGY

## 2019-09-30 PROCEDURE — 72197 MRI PELVIS W/O & W/DYE: CPT

## 2019-09-30 RX ADMIN — GADOBUTROL 10 ML: 604.72 INJECTION INTRAVENOUS at 09:16

## 2019-10-07 ENCOUNTER — TELEPHONE (OUTPATIENT)
Dept: UROLOGY | Facility: CLINIC | Age: 59
End: 2019-10-07

## 2019-10-07 ENCOUNTER — PREP FOR PROCEDURE (OUTPATIENT)
Dept: UROLOGY | Facility: CLINIC | Age: 59
End: 2019-10-07

## 2019-10-07 DIAGNOSIS — R59.1 LYMPHADENOPATHY: ICD-10-CM

## 2019-10-07 DIAGNOSIS — C61 PROSTATE CANCER (HCC): Primary | ICD-10-CM

## 2019-10-07 NOTE — TELEPHONE ENCOUNTER
Called and left message for patient to call the office back  When patient calls the office back please confirm appointment for Friday 10/11/19 at 3:00 with Dr Pedroza at the [facility] office

## 2019-10-07 NOTE — TELEPHONE ENCOUNTER
I called and spoke to [place] by phone regarding his MRI findings showing lymphadenopathy  Given his previous johnson 6 disease as well as his new findings I recommend that he undergo radical prostatectomy and PLND  We will discuss this further this Friday in the office

## 2019-10-10 NOTE — PATIENT INSTRUCTIONS
Robot Assisted Laparoscopic Prostatectomy   WHAT YOU NEED TO KNOW:   Robot-assisted laparoscopic prostatectomy (RALP) is surgery to remove your prostate gland through small incisions in your abdomen  RALP is done with a machine that is controlled by your surgeon  The machine has mechanical arms that use small tools to remove your prostate  HOW TO PREPARE:   The week before your surgery:   · Bring your medicine bottles or a list of your medicines when you see your caregiver  Tell your caregiver if you are allergic to any medicine  Tell your caregiver if you use any herbs, food supplements, or over-the-counter medicine  · Ask your caregiver if you need to stop using aspirin or any other prescribed or over-the-counter medicine before your procedure or surgery  · Tell your healthcare provider about any other surgeries or cancer treatments you had  Tell your healthcare provider if you have had bleeding problems  · You may need a blood transfusion if you lose a large amount of blood during surgery  You may be able to donate your own blood before surgery  You may also ask a family member or friend with the same blood type to donate blood for you  · Arrange a ride home  Ask a family member or friend to drive you home after your surgery or procedure  Do not drive yourself home  · You may need to have tests done before surgery, such as blood tests or a chest x-ray  Ask your healthcare provider for more information about these and other tests that you may need  Write down the date, time, and location of each test     · Write down the correct date, time, and location of your surgery  The day before your surgery:   · Clear liquid diet:  You may have to stop eating solid food for up to 2 days before your surgery  You can drink water, broth, apple juice, or lemon-lime soft drinks  You may also suck on ice chips or eat gelatin  · Bowel cleansing:   You may be given medicine to drink or an enema to clean out your bowel  The day of your surgery:   · You or a close family member will be asked to sign a legal document called a consent form  It gives caregivers permission to do the procedure or surgery  It also explains the problems that may happen, and your choices  Make sure all your questions are answered before you sign this form  · Ask your caregiver before you take any medicine on the day of your surgery  Bring a list of all the medicines you take, or your pill bottles, with you to the hospital  Caregivers will check that your medicines will not interact poorly with the medicine you need for surgery  · Caregivers may insert an intravenous tube (IV) into your vein  A vein in the arm is usually chosen  Through the IV tube, you may be given liquids and medicine  · You may need to take antibiotic medicine to help prevent an infection caused by bacteria  You may get antibiotic medicine before and after your surgery  WHAT WILL HAPPEN:   What will happen:   · You will have monitors to check your heartbeat, blood pressure, and breathing  General anesthesia will keep you asleep during your surgery  A urinary catheter is put into your bladder to drain your urine  Healthcare providers will put stockings on your legs to apply pressure, which will help prevent blood clots  Your legs will then be placed in stirrups  · The robotic arms place a laparoscope and other tools inside your abdomen through small cuts  A laparoscope is a long, thin tube with a light and camera on the end  A gas called carbon dioxide is pumped into your abdomen to inflate it  This gives healthcare providers room so they can see your prostate better  Your surgeon uses the camera to see inside your abdomen  He guides the robotic arms to cut the prostate away from other tissues  The prostate is removed through one of the small cuts in your abdomen  Lymph nodes may also be removed   Your surgeon uses the robotic arms to stitch your incisions closed  After your surgery: You will be taken to a room where you can rest until you are fully awake  Bandages over your incisions will help prevent infection  A urinary catheter will drain your urine while you heal  Do not get out of bed until your healthcare provider says it is okay  Once healthcare providers see that you are not having any problems, you will be taken to your hospital room  CONTACT YOUR HEALTHCARE PROVIDER IF:   · You cannot make it to your surgery on time  · You have a fever  · Your signs and symptoms are getting worse  · You have questions or concerns about your surgery  RISKS:   · You may bleed more than expected or get an infection  Your bladder, ureters (tubes that drain urine from your body), intestines, or rectum could be cut during surgery  After RALP, you may have problems urinating or having bowel movements  You may need more surgery to treat urination problems  You may not be able to have an erection after surgery  Your stitches may come apart  You may have a hernia or develop an abscess (deep infection)  Your recovery may take longer if you need larger cuts in your abdomen  · You may get a blood clot in your arm or leg  The clot may travel to your heart or brain and cause life-threatening problems, such as a heart attack or stroke  Even with surgery, the cancer may come back  · If you do not have the prostatectomy, your signs and symptoms could get worse  Your cancer may spread to other areas of your body and be more difficult to treat  CARE AGREEMENT:   You have the right to help plan your care  Learn about your health condition and how it may be treated  Discuss treatment options with your caregivers to decide what care you want to receive  You always have the right to refuse treatment  © 2017 [employer] Information is for End User's use only and may not be sold, redistributed or otherwise used for commercial purposes   All illustrations and images included in CareNotes® are the copyrighted property of A [employer] M [employer]  or [employer]  The above information is an  only  It is not intended as medical advice for individual conditions or treatments  Talk to your doctor, nurse or pharmacist before following any medical regimen to see if it is safe and effective for you

## 2019-10-10 NOTE — PROGRESS NOTES
Problem List Items Addressed This Visit        Genitourinary    Prostate cancer ([facility] [address] ) - Primary    Relevant Orders    NM bone scan whole body    XR chest pa & lateral    Case request operating room: PROSTATECTOMY RADICAL W ROBOTICS, DISSECTION/STAGING LYMPH NODE PELVIS/ABDOMEN (Completed)            Discussion:  I had a productive talk with Sbaino Cochran today and with his wife  I reviewed with him in real time his imaging studies which show pelvic lymphadenopathy on the right-hand side, measuring up to 3 7 centimeters  I told him that he either has the most aggressive case of Eleele 6 prostate cancer that I have ever seen, or he has other, occult prostate cancer of a higher Panchito score within the prostate that was not reflected on his initial biopsy  Interestingly, his PSA has not increased at a very high rate, and he still is technically within the low risk range of PSA values  I spoke to him about the fact that historically man with metastatic prostate cancer were not treated with radical prostatectomy, however given his excellent performance status and his young age, I do believe that it would be appropriate to offer him a robot assisted laparoscopic radical prostatectomy with extended pelvic lymph node dissection within knowledge that he would almost certainly require additional therapies such as hormone therapy and radiation  I reviewed with him the pre, liliam, and postoperative care for radical prostatectomy and the risks of urinary incontinence, erectile troubles given that he would need a wide resection, and the risks of infection, bleeding, pain, damage to surrounding structures, need for additional procedures, risk of failure of the procedure, risk of anesthesia, risk of positioning complications including neurapraxia, chronic pain, and paralysis as well as risk of rhabdomyolysis and compartment syndrome    Risk of deep venous thrombosis and venous thromboembolism as well as pneumonia and other potential unpredictable complications were also discussed with the patient  I spoke with him about my own outcomes with regard to incontinence as well as potency after radical prostatectomy and outlined to him the series complications that I have had in my own surgical career  I did tell him that radical prostatectomy typically goes well with a time frame of full recovery of between 4-6 weeks, typically  After our discussion he states that he has sought a 2nd opinion from [facility], I told him that this is an excellent idea and that the team is wonderful  I did recommend specifically Dr Carlitos Lauren and Dr Mario Espinosa as these 2 gentleman our excellent prostatectomists and urologists and they were also two of my mentors  I do think it prudent to get a chest x-ray as well as a bone scan given his concerning MRI scan today  I have placed a prep for procedure order to get him scheduled for radical prostatectomy with me in the event that he does not choose to have his treatment at [facility]  If he does end up having his treatment at [facility] I will be happy to assume his long-term urological care after he has convalesced from his prostatectomy at [facility]  I spent over 40 minutes in face-to-face consultation with Debra Martin and with his wife today      Assessment and plan:       Please see problem oriented charting for the assessment plan of today's urological complaints    Savannah Chacon MD      Chief Complaint     Chief Complaint   Patient presents with    Elevated PSA    Low risk prostate cancer, on active surveillance  MRI showing new pelvic lymphadenopathy      History of Present Illness     Mauro Vizcaino is a 62 y o  gentleman that I have seen previously for low risk prostate cancer  He was diagnosed with 2 cores of Panchito 6 prostate cancer and decided upon active surveillance in December 2018      His PSA subsequently increased slightly, to a most recent value of 6 4 which prompted a MRI scan of the pelvis which showed right-sided pelvic lymphadenopathy  When I received this information I called him in for an urgent visit which was performed today  Please see above for discussion of today's clinical issues  No other urologic complaints currently  The following portions of the patient's history were reviewed and updated as appropriate: allergies, current medications, past family history, past medical history, past social history, past surgical history and problem list     Detailed Urologic History     - please refer to HPI    Review of Systems     Review of Systems   Constitutional: Negative  HENT: Negative  Eyes: Negative  Respiratory: Negative  Cardiovascular: Negative  Gastrointestinal: Negative  Endocrine: Negative  Genitourinary:        As per HPI   Musculoskeletal: Negative  Skin: Negative  Allergic/Immunologic: Negative  Neurological: Negative  Hematological: Negative  Psychiatric/Behavioral: Negative  Allergies     No Known Allergies    Physical Exam     Physical Exam   Constitutional: He is oriented to person, place, and time  He appears well-developed and well-nourished  No distress  HENT:   Head: Normocephalic and atraumatic  Eyes: Pupils are equal, round, and reactive to light  EOM are normal  Right eye exhibits no discharge  Left eye exhibits no discharge  Neck: No tracheal deviation present  Cardiovascular: Intact distal pulses  Pulmonary/Chest: Effort normal  No stridor  No respiratory distress  Abdominal: Soft  He exhibits no distension and no mass  There is no tenderness  There is no rebound and no guarding  No hernia  Musculoskeletal: He exhibits no edema, tenderness or deformity  Neurological: He is alert and oriented to person, place, and time  No cranial nerve deficit  Coordination normal    Skin: No rash noted  He is not diaphoretic  No erythema  No pallor  Psychiatric: He has a normal mood and affect  His behavior is normal  Judgment and thought content normal    Nursing note and vitals reviewed  Vital Signs  Vitals:    10/11/19 1511   BP: 122/84   BP Location: Left arm   Patient Position: Sitting   Cuff Size: Adult   Pulse: 101   Weight: 99 8 kg (220 lb)   Height: 5' 11" (1 803 m)         Current Medications       Current Outpatient Medications:     amLODIPine (NORVASC) 10 mg tablet, 10 mg, Disp: , Rfl:     hydrochlorothiazide (HYDRODIURIL) 25 mg tablet, Take 25 mg by mouth daily, Disp: , Rfl: 2    losartan (COZAAR) 100 MG tablet, Take 100 mg by mouth daily, Disp: , Rfl: 2    losartan-hydrochlorothiazide (HYZAAR) 100-25 MG per tablet, , Disp: , Rfl:     metFORMIN (GLUCOPHAGE-XR) 500 mg 24 hr tablet, 500 mg, Disp: , Rfl:     rosuvastatin (CRESTOR) 5 mg tablet, , Disp: , Rfl:     valsartan-hydrochlorothiazide (DIOVAN-HCT) 320-12 5 MG per tablet, 320 tablets, Disp: , Rfl:       Active Problems     Patient Active Problem List   Diagnosis    Elevated PSA, less than 10 ng/ml    Prostate cancer [facility])         Past Medical History     Past Medical History:   Diagnosis Date    Snores          Surgical History     History reviewed  No pertinent surgical history  Family History     History reviewed  No pertinent family history  Social History     Social History     Social History     Tobacco Use   Smoking Status Never Smoker   Smokeless Tobacco Never Used         Pertinent Lab Values     Lab Results   Component Value Date    CREATININE 1 35 (H) 09/06/2019       Lab Results   Component Value Date    PSA 6 4 (H) 08/31/2019    PSA 6 0 (H) 03/02/2019    PSA 5 9 (H) 09/10/2018             Pertinent Imaging       The patient's images were reviewed by me personally and also in real time with them in the examination room using our PACS imaging system    The imaging findings are significant for a concerning lesion within the prostate, category 4, additionally with right-sided pelvic lymphadenopathy measuring up to 3 7 centimeters

## 2019-10-11 ENCOUNTER — OFFICE VISIT (OUTPATIENT)
Dept: UROLOGY | Facility: CLINIC | Age: 59
End: 2019-10-11
Payer: COMMERCIAL

## 2019-10-11 VITALS
WEIGHT: 220 LBS | DIASTOLIC BLOOD PRESSURE: 84 MMHG | BODY MASS INDEX: 30.8 KG/M2 | SYSTOLIC BLOOD PRESSURE: 122 MMHG | HEIGHT: 71 IN | HEART RATE: 101 BPM

## 2019-10-11 DIAGNOSIS — C61 PROSTATE CANCER (HCC): Primary | ICD-10-CM

## 2019-10-11 PROCEDURE — 99215 OFFICE O/P EST HI 40 MIN: CPT | Performed by: UROLOGY

## 2019-10-11 RX ORDER — HYDROCHLOROTHIAZIDE 25 MG/1
25 TABLET ORAL DAILY
Refills: 2 | COMMUNITY
Start: 2019-09-15

## 2019-10-11 RX ORDER — LOSARTAN POTASSIUM 100 MG/1
100 TABLET ORAL DAILY
Refills: 2 | COMMUNITY
Start: 2019-09-15

## 2019-10-14 ENCOUNTER — TELEPHONE (OUTPATIENT)
Dept: UROLOGY | Facility: AMBULATORY SURGERY CENTER | Age: 59
End: 2019-10-14

## 2019-10-14 NOTE — TELEPHONE ENCOUNTER
Patient is calling regarding medical records   He is upset because his records were not sent to [facility], when he requested them on 10/11/19   I told him by law we have 30 days but we try to do it by 7 days    I told him he would receive a phone call once there faxed

## 2019-10-15 NOTE — TELEPHONE ENCOUNTER
Patient re- called in order to have records sent to [facility] ASAP

## 2019-10-15 NOTE — TELEPHONE ENCOUNTER
Called patient and informed him that I re-faxed his records to [facility] and got the fax confirmation  He will call the office back later if they don't receive the records   (Records sitting on my desk in case if they need to be re-faxed after I leave for the day at 2:30)

## 2019-10-17 ENCOUNTER — HOSPITAL ENCOUNTER (OUTPATIENT)
Dept: NUCLEAR MEDICINE | Facility: HOSPITAL | Age: 59
Discharge: HOME | End: 2019-10-17
Attending: UROLOGY
Payer: COMMERCIAL

## 2019-10-17 ENCOUNTER — HOSPITAL ENCOUNTER (OUTPATIENT)
Dept: RADIOLOGY | Facility: HOSPITAL | Age: 59
Discharge: HOME | End: 2019-10-17
Attending: UROLOGY
Payer: COMMERCIAL

## 2019-10-17 DIAGNOSIS — C61 PROSTATE CANCER (HCC): ICD-10-CM

## 2019-10-17 PROCEDURE — A9503 TC99M MEDRONATE: HCPCS

## 2019-10-17 PROCEDURE — 78306 BONE IMAGING WHOLE BODY: CPT

## 2019-10-17 PROCEDURE — 71046 X-RAY EXAM CHEST 2 VIEWS: CPT

## 2019-10-23 ENCOUNTER — TELEPHONE (OUTPATIENT)
Dept: UROLOGY | Facility: MEDICAL CENTER | Age: 59
End: 2019-10-23

## 2019-10-23 NOTE — TELEPHONE ENCOUNTER
Patient of Dr Juana Elizabeth seen at the [facility]  Patient called - reported he had received a call  He can be reached at [phone]  He did confirm appointment on Friday    Thank you

## 2019-10-25 ENCOUNTER — TELEPHONE (OUTPATIENT)
Dept: UROLOGY | Facility: CLINIC | Age: 59
End: 2019-10-25

## 2019-10-25 NOTE — TELEPHONE ENCOUNTER
I was made aware that the patient canceled his visit for today after previously confirming this visit  I did try to call him on his listed numbers but was unable to reach him, just as I tried to reach him some days ago without success  I did reach out to him by way of his listed e-mail address with the below text in the name of ensuring that he has the appropriate follow-up going forward  "Irvin Lau,    I hope you are doing well, I see that you canceled your visit for today  I did try to call you on all of your listed numbers and could not reach you, please let me know what your ultimate treatment decision is, I think that you may be proceeding with treatment at [facility] for my review of the records that are available to me  I do worry about patients such as you when I do not hear from you or see you in the office because I want to make sure that you have the appropriate follow-up in terms of treatment and management      Take care,  Dr Lito Ferrer

## 2019-10-30 ENCOUNTER — TELEPHONE (OUTPATIENT)
Dept: UROLOGY | Facility: CLINIC | Age: 59
End: 2019-10-30

## 2019-10-30 NOTE — TELEPHONE ENCOUNTER
Spoke to pt regarding the surgery Dr Elliot Beverly has ordered for him  He states he is going for a second opinion on Tuesday, 11/5  If he decides to proceed with surgery with Dr Elliot Beverly he said he will call to schedule

## 2020-07-01 ENCOUNTER — TRANSCRIBE ORDERS (OUTPATIENT)
Dept: ADMINISTRATIVE | Facility: HOSPITAL | Age: 60
End: 2020-07-01

## 2020-07-01 ENCOUNTER — HOSPITAL ENCOUNTER (OUTPATIENT)
Dept: RADIOLOGY | Facility: HOSPITAL | Age: 60
Discharge: HOME | End: 2020-07-01
Payer: COMMERCIAL

## 2020-07-01 DIAGNOSIS — M77.8 TENDINITIS OF LEFT SHOULDER: Primary | ICD-10-CM

## 2020-07-01 DIAGNOSIS — M77.8 TENDINITIS OF LEFT SHOULDER: ICD-10-CM

## 2020-07-01 PROCEDURE — 73030 X-RAY EXAM OF SHOULDER: CPT

## 2021-03-10 DIAGNOSIS — Z23 ENCOUNTER FOR IMMUNIZATION: ICD-10-CM

## 2021-03-18 ENCOUNTER — IMMUNIZATIONS (OUTPATIENT)
Dept: FAMILY MEDICINE CLINIC | Facility: HOSPITAL | Age: 61
End: 2021-03-18

## 2021-03-18 DIAGNOSIS — Z23 ENCOUNTER FOR IMMUNIZATION: Primary | ICD-10-CM

## 2021-03-18 PROCEDURE — 0001A SARS-COV-2 / COVID-19 MRNA VACCINE (PFIZER-BIONTECH) 30 MCG: CPT

## 2021-03-18 PROCEDURE — 91300 SARS-COV-2 / COVID-19 MRNA VACCINE (PFIZER-BIONTECH) 30 MCG: CPT

## 2021-04-10 ENCOUNTER — IMMUNIZATIONS (OUTPATIENT)
Dept: FAMILY MEDICINE CLINIC | Facility: HOSPITAL | Age: 61
End: 2021-04-10

## 2021-04-10 DIAGNOSIS — Z23 ENCOUNTER FOR IMMUNIZATION: Primary | ICD-10-CM

## 2021-04-10 PROCEDURE — 0002A SARS-COV-2 / COVID-19 MRNA VACCINE (PFIZER-BIONTECH) 30 MCG: CPT

## 2021-04-10 PROCEDURE — 91300 SARS-COV-2 / COVID-19 MRNA VACCINE (PFIZER-BIONTECH) 30 MCG: CPT

## 2023-03-20 ENCOUNTER — TELEPHONE (OUTPATIENT)
Dept: GASTROENTEROLOGY | Facility: CLINIC | Age: 63
End: 2023-03-20

## 2023-03-20 NOTE — TELEPHONE ENCOUNTER
03/20/23  Screened by: Girish Cartagena MA    Referring Provider HILARY    Pre- Screening: There is no height or weight on file to calculate BMI  Has patient been referred for a routine screening Colonoscopy? yes  Is the patient between 39-70 years old? yes      Previous Colonoscopy yes   If yes:    Date: 2016    Facility: in [place]    Reason:       SCHEDULING STAFF: If the patient is between 39yrs-47yrs, please advise patient to confirm benefits/coverage with their insurance company for a routine screening colonoscopy, some insurance carriers will only cover at [phone] or older  If the patient is over 66years old, please schedule an office visit  Does the patient want to see a Gastroenterologist prior to their procedure OR are they having any GI symptoms? no    Has the patient been hospitalized or had abdominal surgery in the past 6 months? no    Does the patient use supplemental oxygen? no    Does the patient take Coumadin, Lovenox, Plavix, Elliquis, Xarelto, or other blood thinning medication? no    Has the patient had a stroke, cardiac event, or stent placed in the past year? no    SCHEDULING STAFF: If patient answers NO to above questions, then schedule procedure  If patient answers YES to above questions, then schedule office appointment  If patient is between 45yrs - 49yrs, please advise patient that we will have to confirm benefits & coverage with their insurance company for a routine screening colonoscopy

## 2023-03-21 NOTE — TELEPHONE ENCOUNTER
Spoke with the patient and he thinks his colonoscopy in 2016 was normal, but his doctor told him he should have another one  I just cautioned him that if it is too soon the insurance may not cover it if it is just a screening colonoscopy  He is going to look for the report and then call us back

## 2023-03-22 ENCOUNTER — PREP FOR PROCEDURE (OUTPATIENT)
Dept: GASTROENTEROLOGY | Facility: CLINIC | Age: 63
End: 2023-03-22

## 2023-03-22 DIAGNOSIS — Z86.010 HISTORY OF COLON POLYPS: Primary | ICD-10-CM

## 2023-03-22 NOTE — TELEPHONE ENCOUNTER
Pt called back and states he will bring the report from previous colonoscopy with him to procedure but states he was due in 3 years         Scheduled date of colonoscopy (as of today): 06/15/23        Physician performing colonoscopy: josh        Location of colonoscopy: [email or web address]          Clearances: diabetic

## 2023-06-14 ENCOUNTER — ANESTHESIA EVENT (OUTPATIENT)
Dept: ANESTHESIOLOGY | Facility: HOSPITAL | Age: 63
End: 2023-06-14

## 2023-06-14 ENCOUNTER — ANESTHESIA (OUTPATIENT)
Dept: ANESTHESIOLOGY | Facility: HOSPITAL | Age: 63
End: 2023-06-14

## 2023-06-14 RX ORDER — SODIUM CHLORIDE, SODIUM LACTATE, POTASSIUM CHLORIDE, CALCIUM CHLORIDE 600; 310; 30; 20 MG/100ML; MG/100ML; MG/100ML; MG/100ML
125 INJECTION, SOLUTION INTRAVENOUS CONTINUOUS
Status: CANCELLED | OUTPATIENT
Start: 2023-06-14

## 2023-06-15 ENCOUNTER — ANESTHESIA (OUTPATIENT)
Dept: GASTROENTEROLOGY | Facility: HOSPITAL | Age: 63
End: 2023-06-15

## 2023-06-15 ENCOUNTER — ANESTHESIA EVENT (OUTPATIENT)
Dept: GASTROENTEROLOGY | Facility: HOSPITAL | Age: 63
End: 2023-06-15

## 2023-06-15 ENCOUNTER — HOSPITAL ENCOUNTER (OUTPATIENT)
Dept: GASTROENTEROLOGY | Facility: HOSPITAL | Age: 63
Setting detail: OUTPATIENT SURGERY
Discharge: HOME | End: 2023-06-15
Attending: INTERNAL MEDICINE | Admitting: INTERNAL MEDICINE
Payer: COMMERCIAL

## 2023-06-15 VITALS
BODY MASS INDEX: 29.48 KG/M2 | TEMPERATURE: 97.4 F | WEIGHT: 205.91 LBS | SYSTOLIC BLOOD PRESSURE: 126 MMHG | RESPIRATION RATE: 16 BRPM | OXYGEN SATURATION: 100 % | DIASTOLIC BLOOD PRESSURE: 85 MMHG | HEIGHT: 70 IN | HEART RATE: 73 BPM

## 2023-06-15 DIAGNOSIS — Z86.010 HISTORY OF COLON POLYPS: ICD-10-CM

## 2023-06-15 PROCEDURE — G0121 COLON CA SCRN NOT HI RSK IND: HCPCS | Performed by: INTERNAL MEDICINE

## 2023-06-15 RX ORDER — PROPOFOL 10 MG/ML
INJECTION, EMULSION INTRAVENOUS AS NEEDED
Status: DISCONTINUED | OUTPATIENT
Start: 2023-06-15 | End: 2023-06-15

## 2023-06-15 RX ORDER — LIDOCAINE HYDROCHLORIDE 20 MG/ML
INJECTION, SOLUTION EPIDURAL; INFILTRATION; INTRACAUDAL; PERINEURAL AS NEEDED
Status: DISCONTINUED | OUTPATIENT
Start: 2023-06-15 | End: 2023-06-15

## 2023-06-15 RX ORDER — DULAGLUTIDE 1.5 MG/.5ML
1 INJECTION, SOLUTION SUBCUTANEOUS
COMMUNITY
Start: 2023-01-01

## 2023-06-15 RX ORDER — SODIUM CHLORIDE, SODIUM LACTATE, POTASSIUM CHLORIDE, CALCIUM CHLORIDE 600; 310; 30; 20 MG/100ML; MG/100ML; MG/100ML; MG/100ML
INJECTION, SOLUTION INTRAVENOUS CONTINUOUS PRN
Status: DISCONTINUED | OUTPATIENT
Start: 2023-06-15 | End: 2023-06-15

## 2023-06-15 RX ADMIN — SODIUM CHLORIDE, SODIUM LACTATE, POTASSIUM CHLORIDE, AND CALCIUM CHLORIDE: .6; .31; .03; .02 INJECTION, SOLUTION INTRAVENOUS at 07:33

## 2023-06-15 RX ADMIN — PROPOFOL 40 MG: 10 INJECTION, EMULSION INTRAVENOUS at 07:35

## 2023-06-15 RX ADMIN — PROPOFOL 80 MG: 10 INJECTION, EMULSION INTRAVENOUS at 07:33

## 2023-06-15 RX ADMIN — PROPOFOL 40 MG: 10 INJECTION, EMULSION INTRAVENOUS at 07:37

## 2023-06-15 RX ADMIN — PROPOFOL 40 MG: 10 INJECTION, EMULSION INTRAVENOUS at 07:40

## 2023-06-15 RX ADMIN — LIDOCAINE HYDROCHLORIDE 50 MG: 20 INJECTION, SOLUTION EPIDURAL; INFILTRATION; INTRACAUDAL at 07:33

## 2023-06-15 NOTE — ANESTHESIA POSTPROCEDURE EVALUATION
Post-Op Assessment Note    CV Status:  Stable    Pain management: adequate     Mental Status:  Alert and awake   Hydration Status:  Euvolemic   PONV Controlled:  Controlled   Airway Patency:  Patent      Post Op Vitals Reviewed: Yes      Staff: CRNA         No notable events documented      /72 (06/15/23 0745)    Temp (!) 97 4 °F (36 3 °C) (06/15/23 0745)    Pulse 84 (06/15/23 0745)   Resp 16 (06/15/23 0745)    SpO2 99 % (06/15/23 0745)

## 2023-06-15 NOTE — ANESTHESIA PREPROCEDURE EVALUATION
Procedure:  COLONOSCOPY    Relevant Problems   /RENAL   (+) Prostate cancer [facility])        Physical Exam    Airway    Mallampati score: I  TM Distance: >3 FB  Neck ROM: full     Dental   No notable dental hx     Cardiovascular      Pulmonary      Other Findings        Anesthesia Plan  ASA Score- 2     Anesthesia Type- IV sedation with anesthesia with ASA Monitors  Additional Monitors:   Airway Plan:           Plan Factors-Exercise tolerance (METS): >4 METS  Chart reviewed  Patient summary reviewed  Patient is not a current smoker  There is medical exclusion for perioperative obstructive sleep apnea risk education  Induction- intravenous  Postoperative Plan-     Informed Consent- Anesthetic plan and risks discussed with patient  I personally reviewed this patient with the CRNA  Discussed and agreed on the Anesthesia Plan with the CRNA  Laura Ulloa

## 2023-06-15 NOTE — H&P
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[facility] )    • Snores      Past Surgical History:   Procedure Laterality Date   • PROSTATECTOMY  09/04/2020     Social History   Social History     Substance and Sexual Activity   Alcohol Use Yes   • Alcohol/week: 2 0 standard drinks of alcohol   • Types: 1 Glasses of wine, 1 Cans of beer per week    Comment: occ     Social History     Substance and Sexual Activity   Drug Use No     Social History     Tobacco Use   Smoking Status Never   Smokeless Tobacco Never     History reviewed  No pertinent family history  Meds/Allergies     (Not in a hospital admission)      Allergies   Allergen Reactions   • Latex Hives and Itching       Objective     Blood pressure 128/83, pulse 83, temperature 97 5 °F (36 4 °C), temperature source Temporal, resp  rate 16, height 5' 10\" (1 778 m), weight 93 4 kg (205 lb 14 6 oz), SpO2 99 %  PHYSICAL EXAM    /83   Pulse 83   Temp 97 5 °F (36 4 °C) (Temporal)   Resp 16   Ht 5' 10\" (1 778 m)   Wt 93 4 kg (205 lb 14 6 oz)   SpO2 99%   BMI 29 54 kg/m²       Gen: NAD  CV: RRR  CHEST: Clear  ABD: soft, NT/ND  EXT: no edema      ASSESSMENT/PLAN:  This is a 58y o  year old male here for colonoscopy, and he is stable and optimized for his procedure        "

## 2023-10-14 ENCOUNTER — HOSPITAL ENCOUNTER (OUTPATIENT)
Dept: ULTRASOUND IMAGING | Facility: HOSPITAL | Age: 63
Discharge: HOME | End: 2023-10-14
Payer: COMMERCIAL

## 2023-10-14 DIAGNOSIS — R10.12 LEFT UPPER QUADRANT PAIN: ICD-10-CM

## 2023-10-14 PROCEDURE — 76700 US EXAM ABDOM COMPLETE: CPT
